# Patient Record
Sex: MALE | Race: WHITE | NOT HISPANIC OR LATINO | Employment: FULL TIME | URBAN - METROPOLITAN AREA
[De-identification: names, ages, dates, MRNs, and addresses within clinical notes are randomized per-mention and may not be internally consistent; named-entity substitution may affect disease eponyms.]

---

## 2017-02-21 PROBLEM — R21 CUTANEOUS ERUPTION: Status: ACTIVE | Noted: 2017-02-21

## 2017-02-21 PROBLEM — K21.9 ACID REFLUX: Status: ACTIVE | Noted: 2017-02-21

## 2017-02-21 PROBLEM — R42 DIZZINESS: Status: ACTIVE | Noted: 2017-02-21

## 2017-03-02 DIAGNOSIS — Z20.828 EXPOSURE TO THE FLU: Primary | ICD-10-CM

## 2017-03-02 RX ORDER — OSELTAMIVIR PHOSPHATE 75 MG/1
75 CAPSULE ORAL DAILY
Qty: 10 CAPSULE | Refills: 0 | Status: SHIPPED | OUTPATIENT
Start: 2017-03-02 | End: 2017-04-21

## 2017-04-21 ENCOUNTER — OFFICE VISIT (OUTPATIENT)
Dept: FAMILY MEDICINE CLINIC | Facility: CLINIC | Age: 32
End: 2017-04-21

## 2017-04-21 ENCOUNTER — CONVERSION ENCOUNTER (OUTPATIENT)
Dept: FAMILY MEDICINE CLINIC | Facility: CLINIC | Age: 32
End: 2017-04-21

## 2017-04-21 VITALS
DIASTOLIC BLOOD PRESSURE: 78 MMHG | RESPIRATION RATE: 18 BRPM | SYSTOLIC BLOOD PRESSURE: 92 MMHG | HEIGHT: 68 IN | HEART RATE: 51 BPM | OXYGEN SATURATION: 96 % | BODY MASS INDEX: 22.28 KG/M2 | WEIGHT: 147 LBS

## 2017-04-21 DIAGNOSIS — K21.00 REFLUX ESOPHAGITIS: ICD-10-CM

## 2017-04-21 DIAGNOSIS — Z00.00 ANNUAL PHYSICAL EXAM: Primary | ICD-10-CM

## 2017-04-21 PROCEDURE — 99385 PREV VISIT NEW AGE 18-39: CPT | Performed by: PHYSICIAN ASSISTANT

## 2017-04-21 RX ORDER — PANTOPRAZOLE SODIUM 40 MG/1
40 TABLET, DELAYED RELEASE ORAL DAILY
Qty: 30 TABLET | Refills: 3 | Status: SHIPPED | OUTPATIENT
Start: 2017-04-21 | End: 2017-04-21 | Stop reason: SDUPTHER

## 2017-04-21 RX ORDER — PANTOPRAZOLE SODIUM 40 MG/1
40 TABLET, DELAYED RELEASE ORAL DAILY
Qty: 30 TABLET | Refills: 3 | Status: SHIPPED | OUTPATIENT
Start: 2017-04-21 | End: 2017-10-04

## 2017-04-21 NOTE — PROGRESS NOTES
Subjective   Zach Camarena is a 31 y.o. male.   Chief Complaint   Patient presents with   • Annual Exam     pt here for annual exam and labs, no new concerns.         History of Present Illness   Keron is a 31-year-old male who presents to establish as new patient at today's office visit.  He was a prior patient who was last seen in 2013 in office.  I have reviewed his health history form and will to obtain his prior medical records for my review.  He has a history of reflux which she is currently taking Nexium 40 mg a day. Keron is here for annual physical examination.  Keron has been taking over-the-counter Nexium for his GERD symptoms.  States it's not helping as well anymore.  Keron has been on Prilosec in the past without relief of his symptoms.  Currently drinking 40 ounces of coffee a day and 24-32 ounces of coke a day.  Keron denied any chest pain, shortness of air, dizziness, penile discharge, dysuria, or upper respiratory symptoms.  Bowel movements have been daily without dark black tarry stools.  He has had a tetanus with pertussis in 2016.  He has no other complaints at today's office visit.  He is fasting.     The following portions of the patient's history were reviewed and updated as appropriate: allergies, current medications, past family history, past medical history, past social history and past surgical history.    Review of Systems   Constitutional: Negative.    HENT: Negative.    Eyes: Negative.    Respiratory: Negative.    Cardiovascular: Negative.    Gastrointestinal: Negative.         Reflux   Endocrine: Negative.    Genitourinary: Negative.    Musculoskeletal: Negative.    Skin: Negative.    Allergic/Immunologic: Negative.    Neurological: Negative.    Hematological: Negative.    Psychiatric/Behavioral: Negative.          Vitals:    04/21/17 0825   BP: 92/78   BP Location: Left arm   Patient Position: Sitting   Cuff Size: Adult   Pulse: 51   Resp: 18   SpO2: 96%   Weight: 147 lb (66.7 kg)  "  Height: 68\" (172.7 cm)     Body mass index is 22.35 kg/(m^2).     Allergies   Allergen Reactions   • No Known Drug Allergy      Objective   Physical Exam   Constitutional: He is oriented to person, place, and time. Vital signs are normal. He appears well-developed and well-nourished.   HENT:   Head: Normocephalic and atraumatic.   Right Ear: Hearing, tympanic membrane, external ear and ear canal normal.   Left Ear: Hearing, tympanic membrane, external ear and ear canal normal.   Nose: Nose normal. Right sinus exhibits no maxillary sinus tenderness and no frontal sinus tenderness. Left sinus exhibits no maxillary sinus tenderness and no frontal sinus tenderness.   Mouth/Throat: Uvula is midline, oropharynx is clear and moist and mucous membranes are normal.   Eyes: Conjunctivae, EOM and lids are normal. Pupils are equal, round, and reactive to light.   Neck: Trachea normal, normal range of motion and phonation normal. Neck supple. No edema present. No thyroid mass and no thyromegaly present.   Cardiovascular: Normal rate, regular rhythm, S1 normal, S2 normal, normal heart sounds and normal pulses.    Pulmonary/Chest: Effort normal and breath sounds normal.   Abdominal: Soft. Normal appearance and bowel sounds are normal. There is no hepatomegaly. There is no tenderness. No hernia. Hernia confirmed negative in the right inguinal area and confirmed negative in the left inguinal area.   Genitourinary: Testes normal and penis normal. Cremasteric reflex is present. Circumcised.   Genitourinary Comments: Exam was chaperoned by Sumi Hercules MA   Lymphadenopathy:     He has no cervical adenopathy.   Neurological: He is alert and oriented to person, place, and time. He has normal reflexes.   Reflex Scores:       Patellar reflexes are 2+ on the right side and 2+ on the left side.  Skin: Skin is warm, dry and intact.   Psychiatric: He has a normal mood and affect. His speech is normal and behavior is normal. Judgment and " thought content normal. Cognition and memory are normal.       Assessment/Plan   Zach was seen today for annual exam.    Diagnoses and all orders for this visit:    Annual physical exam  -     CBC & Differential  -     Comprehensive Metabolic Panel  -     Lipid Panel With LDL / HDL Ratio  -     Thyroid Panel With TSH    Reflux esophagitis  -     Discontinue: pantoprazole (PROTONIX) 40 MG EC tablet; Take 1 tablet by mouth Daily.  -     pantoprazole (PROTONIX) 40 MG EC tablet; Take 1 tablet by mouth Daily.    1.  New annual Physical examination: I have reviewed Keron's history form and will try to obtain his medical records for my review.  He is fasting at today's office visit will have a CBC, CMP, lipid profile and a thyroid profile with TSH.  He was encouraged to increase his physical activity and to eat healthy.  Keron will be notified of test results when completed.  2.  New GERD: Keron has been taking Nexium for this past several years.  He has had a EGD greater than 5 years ago that showed esophageal reflux.  Currently  Is not helping with his GERD symptoms.  Span on Prilosec in the past.  I have sent to pharmacy a prescription for Protonix to see if this helps improve his symptoms.  I've asked him to stop the Nexium at this time.  I've encouraged Keron to decrease his caffeine intake as well.  He will update status in the next few weeks.      Dragon transcription disclaimer    Much of this encounter note is an electronic transcription/translation of spoken language to printed text.  The electronic translation of spoken language may permit erroneous, or at times, nonsensical words or phrases to be inadvertently transcribed.  Although I have reviewed the note for such errors, some may still exist.    Tashia Liu PA-C  Family Practice

## 2017-04-22 LAB
ALBUMIN SERPL-MCNC: 4.9 G/DL (ref 3.5–5.2)
ALBUMIN/GLOB SERPL: 2.1 G/DL
ALP SERPL-CCNC: 75 U/L (ref 40–129)
ALT SERPL-CCNC: 11 U/L (ref 5–41)
AST SERPL-CCNC: 18 U/L (ref 5–40)
BASOPHILS # BLD AUTO: 0.06 10*3/MM3 (ref 0–0.2)
BASOPHILS NFR BLD AUTO: 1.1 % (ref 0–2)
BILIRUB SERPL-MCNC: 0.4 MG/DL (ref 0.2–1.2)
BUN SERPL-MCNC: 15 MG/DL (ref 6–20)
BUN/CREAT SERPL: 12.3 (ref 7–25)
CALCIUM SERPL-MCNC: 10.1 MG/DL (ref 8.6–10.5)
CHLORIDE SERPL-SCNC: 100 MMOL/L (ref 98–107)
CHOLEST SERPL-MCNC: 228 MG/DL (ref 0–200)
CO2 SERPL-SCNC: 27.5 MMOL/L (ref 22–29)
CREAT SERPL-MCNC: 1.22 MG/DL (ref 0.76–1.27)
EOSINOPHIL # BLD AUTO: 0.27 10*3/MM3 (ref 0.1–0.3)
EOSINOPHIL NFR BLD AUTO: 5 % (ref 0–4)
ERYTHROCYTE [DISTWIDTH] IN BLOOD BY AUTOMATED COUNT: 12.6 % (ref 11.5–14.5)
FT4I SERPL CALC-MCNC: 1.9 (ref 1.2–4.9)
GLOBULIN SER CALC-MCNC: 2.3 GM/DL
GLUCOSE SERPL-MCNC: 84 MG/DL (ref 65–99)
HCT VFR BLD AUTO: 45.9 % (ref 42–52)
HDLC SERPL-MCNC: 57 MG/DL (ref 40–60)
HGB BLD-MCNC: 15.4 G/DL (ref 14–18)
IMM GRANULOCYTES # BLD: 0.03 10*3/MM3 (ref 0–0.03)
IMM GRANULOCYTES NFR BLD: 0.6 % (ref 0–0.5)
LDLC SERPL CALC-MCNC: 143 MG/DL (ref 0–100)
LDLC/HDLC SERPL: 2.52 {RATIO}
LYMPHOCYTES # BLD AUTO: 2.39 10*3/MM3 (ref 0.6–4.8)
LYMPHOCYTES NFR BLD AUTO: 44.5 % (ref 20–45)
MCH RBC QN AUTO: 28.7 PG (ref 27–31)
MCHC RBC AUTO-ENTMCNC: 33.6 G/DL (ref 31–37)
MCV RBC AUTO: 85.6 FL (ref 80–94)
MONOCYTES # BLD AUTO: 0.54 10*3/MM3 (ref 0–1)
MONOCYTES NFR BLD AUTO: 10.1 % (ref 3–8)
NEUTROPHILS # BLD AUTO: 2.08 10*3/MM3 (ref 1.5–8.3)
NEUTROPHILS NFR BLD AUTO: 38.7 % (ref 45–70)
NRBC BLD AUTO-RTO: 0 /100 WBC (ref 0–0)
PLATELET # BLD AUTO: 229 10*3/MM3 (ref 140–500)
POTASSIUM SERPL-SCNC: 4.9 MMOL/L (ref 3.5–5.2)
PROT SERPL-MCNC: 7.2 G/DL (ref 6–8.5)
RBC # BLD AUTO: 5.36 10*6/MM3 (ref 4.7–6.1)
SODIUM SERPL-SCNC: 143 MMOL/L (ref 136–145)
T3RU NFR SERPL: 30 % (ref 24–39)
T4 SERPL-MCNC: 6.2 UG/DL (ref 4.5–12)
TRIGL SERPL-MCNC: 138 MG/DL (ref 0–150)
TSH SERPL DL<=0.005 MIU/L-ACNC: 3.29 UIU/ML (ref 0.45–4.5)
VLDLC SERPL CALC-MCNC: 27.6 MG/DL (ref 8–32)
WBC # BLD AUTO: 5.37 10*3/MM3 (ref 4.8–10.8)

## 2017-04-24 ENCOUNTER — TELEPHONE (OUTPATIENT)
Dept: FAMILY MEDICINE CLINIC | Facility: CLINIC | Age: 32
End: 2017-04-24

## 2017-04-24 NOTE — TELEPHONE ENCOUNTER
----- Message from Tashia Liu PA-C sent at 4/24/2017  7:04 AM EDT -----  1.  Notify patient that fasting blood sugar was normal at 4.  2.  Cholesterol was 228, triglycerides 138, HDL 57 and LDL was 143.  Cholesterol readings were slightly elevated.  Please decrease fatty food intake and increase physical activity.  Plan to repeat lipid profile in one year.  3.  Thyroid testing was normal.

## 2017-08-25 ENCOUNTER — OFFICE VISIT (OUTPATIENT)
Dept: FAMILY MEDICINE CLINIC | Facility: CLINIC | Age: 32
End: 2017-08-25

## 2017-08-25 VITALS
DIASTOLIC BLOOD PRESSURE: 68 MMHG | SYSTOLIC BLOOD PRESSURE: 100 MMHG | OXYGEN SATURATION: 97 % | HEIGHT: 68 IN | RESPIRATION RATE: 20 BRPM | BODY MASS INDEX: 22.13 KG/M2 | HEART RATE: 58 BPM | TEMPERATURE: 98.4 F | WEIGHT: 146 LBS

## 2017-08-25 DIAGNOSIS — J02.9 SORE THROAT: Primary | ICD-10-CM

## 2017-08-25 DIAGNOSIS — J02.9 PHARYNGITIS, UNSPECIFIED ETIOLOGY: ICD-10-CM

## 2017-08-25 LAB
EXPIRATION DATE: NORMAL
INTERNAL CONTROL: NORMAL
Lab: NORMAL
S PYO AG THROAT QL: NEGATIVE

## 2017-08-25 PROCEDURE — 87880 STREP A ASSAY W/OPTIC: CPT | Performed by: NURSE PRACTITIONER

## 2017-08-25 PROCEDURE — 99213 OFFICE O/P EST LOW 20 MIN: CPT | Performed by: NURSE PRACTITIONER

## 2017-08-25 RX ORDER — AMOXICILLIN 500 MG/1
500 CAPSULE ORAL 3 TIMES DAILY
Qty: 21 CAPSULE | Refills: 0 | Status: SHIPPED | OUTPATIENT
Start: 2017-08-25 | End: 2017-09-01

## 2017-08-25 NOTE — PROGRESS NOTES
Subjective   Zach Camarena is a 31 y.o. male.   Chief Complaint   Patient presents with   • Sore Throat     x 3 days,trouble sleeping     Vitals:    08/25/17 1421   BP: 100/68   Pulse: 58   Resp: 20   Temp: 98.4 °F (36.9 °C)   SpO2: 97%       Allergies   Allergen Reactions   • No Known Drug Allergy        HPI Comments: Very painful at night.     Sore Throat    This is a new problem. The current episode started in the past 7 days. The problem has been gradually worsening. Neither side of throat is experiencing more pain than the other. The maximum temperature recorded prior to his arrival was 100.4 - 100.9 F. The fever has been present for less than 1 day. The pain is moderate. Associated symptoms include coughing (non-productive), a hoarse voice and a plugged ear sensation. Pertinent negatives include no congestion, headaches, shortness of breath or trouble swallowing. He has had no exposure to strep. He has tried nothing for the symptoms.        The following portions of the patient's history were reviewed and updated as appropriate: allergies, current medications, past family history, past medical history, past social history, past surgical history and problem list.    Review of Systems   Constitutional: Positive for chills and fever.   HENT: Positive for hoarse voice, postnasal drip, sore throat and voice change. Negative for congestion and trouble swallowing.    Respiratory: Positive for cough (non-productive). Negative for shortness of breath.    Musculoskeletal: Negative.    Skin: Negative.    Neurological: Negative for headaches.   All other systems reviewed and are negative.      Objective   Physical Exam   Constitutional: He is oriented to person, place, and time. He appears well-developed and well-nourished.   HENT:   Mouth/Throat: Oropharynx is clear and moist and mucous membranes are normal. No posterior oropharyngeal erythema or tonsillar abscesses.   PND drainage.    Cardiovascular: Normal rate.     Pulmonary/Chest: Effort normal.   Neurological: He is alert and oriented to person, place, and time.   Skin: Skin is warm and dry.   Psychiatric: He has a normal mood and affect. His behavior is normal. Judgment and thought content normal.   Nursing note and vitals reviewed.      Assessment/Plan   Problems Addressed this Visit     None      Visit Diagnoses     Sore throat    -  Primary    Relevant Orders    POCT rapid strep A (Completed)    Pharyngitis, unspecified etiology        Relevant Medications    amoxicillin (AMOXIL) 500 MG capsule        Tylenol and Aleve as needed for fever and pain.   May try anti-histamine for mucous reduction.   If no improvement by Monday, may fill abx.     Results for orders placed or performed in visit on 08/25/17   POCT rapid strep A   Result Value Ref Range    Rapid Strep A Screen Negative Negative, VALID, INVALID, Not Performed    Internal Control Passed Passed    Lot Number [id number]     Expiration Date 2018-8-31

## 2017-10-04 ENCOUNTER — OFFICE VISIT (OUTPATIENT)
Dept: FAMILY MEDICINE CLINIC | Facility: CLINIC | Age: 32
End: 2017-10-04

## 2017-10-04 ENCOUNTER — HOSPITAL ENCOUNTER (OUTPATIENT)
Dept: GENERAL RADIOLOGY | Facility: HOSPITAL | Age: 32
Discharge: HOME | End: 2017-10-04
Admitting: PHYSICIAN ASSISTANT

## 2017-10-04 VITALS
HEIGHT: 68 IN | DIASTOLIC BLOOD PRESSURE: 70 MMHG | RESPIRATION RATE: 16 BRPM | SYSTOLIC BLOOD PRESSURE: 100 MMHG | BODY MASS INDEX: 21.98 KG/M2 | WEIGHT: 145 LBS | HEART RATE: 58 BPM | OXYGEN SATURATION: 99 % | TEMPERATURE: 98.4 F

## 2017-10-04 DIAGNOSIS — Z23 FLU VACCINE NEED: ICD-10-CM

## 2017-10-04 DIAGNOSIS — M25.511 CHRONIC RIGHT SHOULDER PAIN: ICD-10-CM

## 2017-10-04 DIAGNOSIS — G89.29 CHRONIC RIGHT SHOULDER PAIN: ICD-10-CM

## 2017-10-04 DIAGNOSIS — M25.511 CHRONIC RIGHT SHOULDER PAIN: Primary | ICD-10-CM

## 2017-10-04 DIAGNOSIS — G89.29 CHRONIC RIGHT SHOULDER PAIN: Primary | ICD-10-CM

## 2017-10-04 DIAGNOSIS — K21.00 GASTROESOPHAGEAL REFLUX DISEASE WITH ESOPHAGITIS: ICD-10-CM

## 2017-10-04 PROCEDURE — 90686 IIV4 VACC NO PRSV 0.5 ML IM: CPT | Performed by: PHYSICIAN ASSISTANT

## 2017-10-04 PROCEDURE — 73030 X-RAY EXAM OF SHOULDER: CPT

## 2017-10-04 PROCEDURE — 90471 IMMUNIZATION ADMIN: CPT | Performed by: PHYSICIAN ASSISTANT

## 2017-10-04 PROCEDURE — 99214 OFFICE O/P EST MOD 30 MIN: CPT | Performed by: PHYSICIAN ASSISTANT

## 2017-10-04 RX ORDER — RANITIDINE 150 MG/1
150 CAPSULE ORAL 2 TIMES DAILY
Qty: 60 CAPSULE | Refills: 11 | Status: SHIPPED | OUTPATIENT
Start: 2017-10-04 | End: 2018-01-31 | Stop reason: ALTCHOICE

## 2017-10-04 RX ORDER — ESOMEPRAZOLE MAGNESIUM 40 MG/1
40 CAPSULE, DELAYED RELEASE ORAL
COMMUNITY
End: 2017-10-04

## 2017-10-04 NOTE — PROGRESS NOTES
"Subjective   Zach Camarena is a 31 y.o. male.   Chief Complaint   Patient presents with   • Pain     right shoulder       History of Present Illness     Keron is a 31 year old male who presents with right shoulder pain for the past right shoulder pain for the past several months.  Describes the pain has a dull ache that waxes and wanes.   He has notice decreased range of motion.  Keron has pain when throwing the ball or lifting weights.He has problems sleeping on his right side.  Denied any injury/trauma.   Keron has not used any OTC medications or sport creams. He still GERD symptoms.  Keron does smokeless tobacco.  She has taken OTC Protonix and Nexium.    He had an EGD [id number].  Caffeine intake energy drink and 48 oz of coffee when he is working as a .  Appetite and sleep has been normal.        The following portions of the patient's history were reviewed and updated as appropriate: allergies, current medications, past family history, past medical history, past social history and past surgical history.    Review of Systems   Constitutional: Negative.    HENT: Negative.    Eyes: Negative.    Respiratory: Negative.  Negative for cough, chest tightness, shortness of breath and wheezing.    Cardiovascular: Negative.  Negative for chest pain, palpitations and leg swelling.   Gastrointestinal: Negative.    Endocrine: Negative.    Genitourinary: Negative.    Musculoskeletal: Positive for arthralgias.   Skin: Negative.    Allergic/Immunologic: Negative.    Neurological: Negative.    Hematological: Negative.    Psychiatric/Behavioral: Negative.    All other systems reviewed and are negative.    Vitals:    10/04/17 1425   BP: 100/70   BP Location: Left arm   Patient Position: Sitting   Cuff Size: Adult   Pulse: 58   Resp: 16   Temp: 98.4 °F (36.9 °C)   TempSrc: Oral   SpO2: 99%   Weight: 145 lb (65.8 kg)   Height: 68\" (172.7 cm)       Wt Readings from Last 3 Encounters:   10/04/17 145 lb (65.8 kg)   08/25/17 " 146 lb (66.2 kg)   04/21/17 147 lb (66.7 kg)       BP Readings from Last 3 Encounters:   10/04/17 100/70   08/25/17 100/68   04/21/17 92/78     Body mass index is 22.05 kg/(m^2).  Allergies   Allergen Reactions   • No Known Drug Allergy        Objective   Physical Exam   Constitutional: He is oriented to person, place, and time. Vital signs are normal. He appears well-developed.   Neck: Trachea normal and phonation normal. Neck supple. No edema present.   Cardiovascular: Normal rate, regular rhythm, S1 normal, S2 normal, normal heart sounds and normal pulses.    Pulmonary/Chest: Effort normal and breath sounds normal.   Abdominal: Soft. Normal appearance and bowel sounds are normal. There is no hepatomegaly. There is no tenderness.   Musculoskeletal:        Right shoulder: He exhibits tenderness and bony tenderness. He exhibits normal range of motion, no swelling, no pain, no spasm, normal pulse and normal strength.   Neurological: He is alert and oriented to person, place, and time.   Skin: Skin is warm, dry and intact.   Psychiatric: He has a normal mood and affect. His speech is normal and behavior is normal. Judgment and thought content normal. Cognition and memory are normal.       Assessment/Plan   Zach was seen today for pain.    Diagnoses and all orders for this visit:    Chronic right shoulder pain  -     XR Shoulder 2+ View Right; Future    Flu vaccine need  -     Flu Vaccine Quad PF 3YR+ (FLUARIX/FLUZONE [id number])    Gastroesophageal reflux disease with esophagitis  -     ranitidine (ZANTAC) 150 MG capsule; Take 1 capsule by mouth 2 (Two) Times a Day.  -     Ambulatory Referral to Gastroenterology    1. New shoulder pain: Zach has been having right shoulder pain for several months.  He will have a right shoulder x-ray at the [facility] today to further evaluate his symptoms.  We will consider possible physical therapy or orthopedic referral in future if warranted.  He'll be notified of test  results when completed.  2.  Need for influenza immunization: Keron has given written consent and will receive the flu shot at today's office visit.  3.  Chronic GERD: He is taking his Nexium or Protonix medication daily.  I will schedule a referral to GI for possible EGD.  I will change his patient to Zantac 150 mg to take twice daily.  I've encouraged him to quit his smokeless tobacco products as well.  He was encouraged to decrease his caffeine intake.          Dragon transcription disclaimer    Much of this encounter note is an electronic transcription/translation of spoken language to printed text.  The electronic translation of spoken language may permit erroneous, or at times, nonsensical words or phrases to be inadvertently transcribed.  Although I have reviewed the note for such errors, some may still exist.    Tashia Liu PA-C  Family Practice

## 2017-10-05 ENCOUNTER — TELEPHONE (OUTPATIENT)
Dept: FAMILY MEDICINE CLINIC | Facility: CLINIC | Age: 32
End: 2017-10-05

## 2017-10-05 DIAGNOSIS — G89.29 CHRONIC RIGHT SHOULDER PAIN: Primary | ICD-10-CM

## 2017-10-05 DIAGNOSIS — M25.511 CHRONIC RIGHT SHOULDER PAIN: Primary | ICD-10-CM

## 2017-10-05 NOTE — TELEPHONE ENCOUNTER
----- Message from Tashia Liu PA-C sent at 10/5/2017  7:39 AM EDT -----  notify patient that his shoulder x-ray showed chronic deformity of his mid right clavicle.  This is from his old clavicle fracture.  No acute process noted.  I will schedule an appointment with Dr. Adams at sports medicine at [place] for possible injection into shoulder for bursitis.

## 2017-10-10 ENCOUNTER — OFFICE VISIT (OUTPATIENT)
Dept: SPORTS MEDICINE | Facility: CLINIC | Age: 32
End: 2017-10-10

## 2017-10-10 VITALS
SYSTOLIC BLOOD PRESSURE: 116 MMHG | HEIGHT: 68 IN | WEIGHT: 144 LBS | DIASTOLIC BLOOD PRESSURE: 70 MMHG | BODY MASS INDEX: 21.82 KG/M2

## 2017-10-10 DIAGNOSIS — M75.41 IMPINGEMENT SYNDROME OF RIGHT SHOULDER: Primary | ICD-10-CM

## 2017-10-10 PROCEDURE — 99244 OFF/OP CNSLTJ NEW/EST MOD 40: CPT | Performed by: FAMILY MEDICINE

## 2017-10-10 PROCEDURE — 20610 DRAIN/INJ JOINT/BURSA W/O US: CPT | Performed by: FAMILY MEDICINE

## 2017-10-10 RX ORDER — TRIAMCINOLONE ACETONIDE 40 MG/ML
80 INJECTION, SUSPENSION INTRA-ARTICULAR; INTRAMUSCULAR ONCE
Status: COMPLETED | OUTPATIENT
Start: 2017-10-10 | End: 2017-10-10

## 2017-10-10 RX ADMIN — TRIAMCINOLONE ACETONIDE 80 MG: 40 INJECTION, SUSPENSION INTRA-ARTICULAR; INTRAMUSCULAR at 15:19

## 2017-10-10 NOTE — PROGRESS NOTES
"Zach is a 31 y.o. year old male    Chief Complaint   Patient presents with   • Arm Pain     Right       History of Present Illness  HPI   Here today for R shoulder/upper arm pain for about 6 months. Mild, nagging burning pain. Worse with throwing motion or laying on that side. PMH clavicle fracture around 2001, treated surgically with bony debridement.  Also history last year of left clavicle and scapular fracture.  Recent xrays in [place], referred by Tashia Liu for further evaluation and management.     I have reviewed the patient's medical, family, and social history in detail and updated the computerized patient record.    Review of Systems   Constitutional: Negative for fever.   Skin: Negative for wound.   Neurological: Negative for numbness.   All other systems reviewed and are negative.      /70  Ht 68\" (172.7 cm)  Wt 144 lb (65.3 kg)  BMI 21.9 kg/m2     Physical Exam    Vital signs reviewed.   General: No acute distress.  Eyes: conjunctiva clear; pupils equally round and reactive  ENT: external ears and nose atraumatic; oropharynx clear  CV: no peripheral edema, 2+ distal pulses  Resp: normal respiratory effort, no use of accessory muscles  Skin: no rashes or wounds; normal turgor  Psych: mood and affect appropriate; recent and remote memory intact  Neuro: sensation to light touch intact    MSK Exam:  Right Shoulder Exam     Tenderness   Right shoulder tenderness location: subacromial space.    Range of Motion   The patient has normal right shoulder ROM.  Right shoulder active abduction: painful.   Right shoulder internal rotation 0 degrees: painful.     Muscle Strength   The patient has normal right shoulder strength.    Tests   Drop Arm: negative  Hawkin's test: positive  Impingement: positive    Other   Erythema: absent  Sensation: normal    Comments:  There is a prominent middle third of the clavicle          Review images and report of recent shoulder x-rays performed at an outside " "facility.  There are are chronic changes related to previous fracture and surgical debridement of the middle third of the clavicle.  Otherwise normal.    Shoulder Injection Procedure Note    Right shoulder subacromial bursa injection was discussed with the patient in detail, including indication, risks, benefits, and alternatives. Verbal consent was given for the procedure. Injection was performed by MD.  Injection site was identified by physical examination and cleaned with Betadine and alcohol swabs. Prior to needle insertion, ethyl chloride spray was used for surface anesthesia. Sterile technique was used.  A 25-gauge, 1.5\" needle was directed to the joint from a posterior approach. Injectate was passed into the subacromial bursa without difficulty. The needle was removed and a simple bandage was applied. The procedure was tolerated well without difficulty.    Injection mixture:  1% lidocaine without epinephrine: 2 mL  0.5% bupivacaine without epinephrine: 2 mL  40 mg/mL triamcinolone acetonide: 2 mL       Diagnoses and all orders for this visit:    Impingement syndrome of right shoulder  -     Ambulatory Referral to Physical Therapy Evaluate and treat  -     triamcinolone acetonide (KENALOG-40) injection 80 mg; Inject 2 mL into the joint 1 (One) Time.    Discussed diagnostic and treatment considerations in detail.  I think this is most likely secondary impingement in the shoulder and not a sign of any underlying structural injury to the shoulder.  Agreed upon injection today.  Expect physical therapy to be a brief course for home stability strengthening and optimizing his rotator cuff function.  Could also have a component of scapular dyskinesis as well.    EMR Dragon/Transcription disclaimer:    Much of this encounter note is an electronic transcription/translation of spoken language to printed text.  The electronic translation of spoken language may permit erroneous, or at times, nonsensical words or phrases " to be inadvertently transcribed.  Although I have reviewed the note for such errors some may still exist.

## 2017-10-17 ENCOUNTER — TREATMENT (OUTPATIENT)
Dept: PHYSICAL THERAPY | Facility: CLINIC | Age: 32
End: 2017-10-17

## 2017-10-17 DIAGNOSIS — M75.41 IMPINGEMENT SYNDROME OF RIGHT SHOULDER: Primary | ICD-10-CM

## 2017-10-17 PROCEDURE — 97110 THERAPEUTIC EXERCISES: CPT | Performed by: PHYSICAL THERAPIST

## 2017-10-17 PROCEDURE — 97161 PT EVAL LOW COMPLEX 20 MIN: CPT | Performed by: PHYSICAL THERAPIST

## 2017-10-17 PROCEDURE — 97140 MANUAL THERAPY 1/> REGIONS: CPT | Performed by: PHYSICAL THERAPIST

## 2017-10-17 NOTE — PROGRESS NOTES
Physical Therapy Initial Evaluation and Plan of Care        Patient: Zach Camarena   : 1985  Diagnosis/ICD-10 Code:  Impingement syndrome of right shoulder [M75.41]  Referring practitioner: Francisco Coffey MD  Date of Initial Visit: 10/17/2017  Today's Date: 10/17/2017  Patient seen for 1 sessions               Subjective Evaluation    History of Present Illness  Date of onset: 2017  Mechanism of injury: Progressive right shoulder pain- Right clavicle fx  - Left clavicle and scapula fx - 2016      Patient Occupation:    Precautions and Work Restrictions: noneQuality of life: excellent    Pain  Current pain ratin  At best pain ratin  At worst pain ratin  Location: bicep muscle belly   Quality: dull ache (nagging)  Relieving factors: rest  Exacerbated by: laying on right side, throwing baseball.  Progression: no change    Hand dominance: right    Diagnostic Tests  X-ray: normal (healed clavicle fx)    Treatments  Current treatment: injection treatment  Current treatment comments: cortisone injection .     Patient Goals  Patient goals for therapy: decreased pain, increased motion, increased strength, independence with ADLs/IADLs and return to sport/leisure activities             Objective       Palpation     Right Tenderness of the pectoralis major and pectoralis minor.     Tenderness     Right Shoulder  Tenderness in the biceps tendon (proximal) and bicipital groove.     Active Range of Motion     Right Shoulder   Flexion: 180 degrees   Abduction: 180 degrees     Strength/Myotome Testing     Right Shoulder     Planes of Motion   Right shoulder forward flexion strength: 5-/5.   Abduction: 5   External rotation at 45°: 5   Internal rotation at 45°: 5     Isolated Muscles   Right biceps strength: 5-/5.     Tests     Right Shoulder   Positive AC shear, Hawkin's and Speed's.   Negative active compression (Ross), clunk, drop arm, empty can, Neer's, Jc's sign, ULTT1,  SLADE2 and Ars.          Assessment & Plan     Assessment  Impairments: abnormal muscle firing, abnormal or restricted ROM, activity intolerance, impaired physical strength and pain with function  Assessment details: 31 y.o male presents with 1) tender right LHB tendon 2) decreased right shoulder strength 3) positive Speeds and Mistry-Patrick 4) decreased tolerance to throwing baseball and repetitive right UE activity   Prognosis: fair  Functional Limitations: carrying objects, lifting, pulling, pushing, uncomfortable because of pain, moving in bed, reaching behind back, reaching overhead and unable to perform repetitive tasks  Goals  Plan Goals: SHORT TERM GOALS:     6 weeks  1. Pt I w/ HEP  2. Pt to demonstrate ability to perform 30 minutes continuous activity in the clinic without increase in pain     LONG TERM GOALS:   12 weeks  1. Pt to demonstrate AROM of the right shoulder to WNL to allow ability to perform all necessary functional activities  2. Pt to demonstrate ability to lift 15# OH with the right arm without increase in pain  3. Pt to report being able to work full duty without increase in pain in the shoulder  4. Pt to tolerate 60 minutes continuous activity in the clinic without increase in pain     Plan  Therapy options: will be seen for skilled physical therapy services  Planned modality interventions: cryotherapy, iontophoresis, thermotherapy (hydrocollator packs) and ultrasound  Other planned modality interventions: dry needling  Planned therapy interventions: ADL retraining, fine motor coordination training, flexibility, functional ROM exercises, home exercise program, joint mobilization, manual therapy, neuromuscular re-education, postural training, soft tissue mobilization, strengthening, stretching and therapeutic activities  Frequency: 2x week  Duration in weeks: 8  Treatment plan discussed with: patient        Manual Therapy:    8     mins  [id number];  Therapeutic Exercise:    15    mins   [id number];     Neuromuscular Ge:        mins  11873;    Therapeutic Activity:          mins  73798;     Gait Training:           mins  60070;     Ultrasound:    8     mins  26481;    Electrical Stimulation:         mins  32839 ( );  Dry Needling          mins self-pay    Timed Treatment:   31   mins   Total Treatment:      55  mins    PT SIGNATURE: Claudia Garcia, PT   DATE TREATMENT INITIATED: 10/17/2017    Initial Certification  Certification Period: 1/15/2018  I certify that the therapy services are furnished while this patient is under my care.  The services outlined above are required by this patient, and will be reviewed every 90 days.     PHYSICIAN: Francisco Coffey MD      DATE:     Please sign and return via fax to [phone].. Thank you, [facility].

## 2017-11-06 ENCOUNTER — TREATMENT (OUTPATIENT)
Dept: PHYSICAL THERAPY | Facility: CLINIC | Age: 32
End: 2017-11-06

## 2017-11-06 DIAGNOSIS — M75.41 IMPINGEMENT SYNDROME OF RIGHT SHOULDER: Primary | ICD-10-CM

## 2017-11-06 PROCEDURE — 97110 THERAPEUTIC EXERCISES: CPT | Performed by: PHYSICAL THERAPIST

## 2017-11-06 PROCEDURE — 97530 THERAPEUTIC ACTIVITIES: CPT | Performed by: PHYSICAL THERAPIST

## 2017-11-06 PROCEDURE — 97035 APP MDLTY 1+ULTRASOUND EA 15: CPT | Performed by: PHYSICAL THERAPIST

## 2017-11-06 PROCEDURE — 97112 NEUROMUSCULAR REEDUCATION: CPT | Performed by: PHYSICAL THERAPIST

## 2017-11-06 NOTE — PROGRESS NOTES
Physical Therapy Daily Progress Note    Time In 12:55  Time Out 2:00  Visit # : 2  Zach Camarena reports: pain began 5-6 months ago. No trauma but did training for KSP with a lot of repetitive use of the shoulder ie pushups reaching over in the shoulder.  No rehab after left scapular fx, just worked out on my own.  Bench press, curls, squats, hammer curls, bent over rows,  flys without pain. No longer doing overhead activities at the gym.     Subjective     Objective   See Exercise, Manual, and Modality Logs for complete treatment.   Infraspinatus tender, winging scapula with abduction, flexion.  (-) impingement (HK, empty can, Neer's)  Lats 4/5, mid trap 4/5 bilaterally, LT 4/5, rhomboids 5/5  IR/ER at 90 degrees 4/5, pain with IR MMT and placement into ER fully.  IR at 90 degrees 70.  Joint mobility right GH joint WNL except posterior with full IR.    Assessment/Plan  Impingement tests negative today.  Only recreated patient's pain with arm under pillow and leaning on the bicep.  Today, pain is in scapula which is a new area. Some weakness in the scapular muscles but no significant pain with testing.  Definite imbalance from anterior to posterior.   There is noted to be dyskinesia which could be from the prior fractures but most likely worse from the weakness posteriorly.    Progress strengthening /stabilization /functional activity to normalize kinematics at the shoulder and scapula.  Assess new strength exercises.         Manual Therapy:          mins  64381;  Therapeutic Exercise:   10     mins  31854;     Neuromuscular Ge:    10    mins  99906;    Therapeutic Activity:     15     mins  24348;     Gait Training:            mins  47171;     Ultrasound:     6/8     mins  35642;    Electrical Stimulation:          mins  08588 ( );  Dry Needling           mins self-pay    Timed Treatment:   53   mins   Total Treatment:     65   mins    Catalina Cochran, PT  Physical Therapist

## 2017-11-08 ENCOUNTER — TREATMENT (OUTPATIENT)
Dept: PHYSICAL THERAPY | Facility: CLINIC | Age: 32
End: 2017-11-08

## 2017-11-08 DIAGNOSIS — M75.41 IMPINGEMENT SYNDROME OF RIGHT SHOULDER: Primary | ICD-10-CM

## 2017-11-08 PROCEDURE — 97140 MANUAL THERAPY 1/> REGIONS: CPT | Performed by: PHYSICAL THERAPIST

## 2017-11-08 PROCEDURE — 97110 THERAPEUTIC EXERCISES: CPT | Performed by: PHYSICAL THERAPIST

## 2017-11-08 NOTE — PROGRESS NOTES
Physical Therapy Daily Progress Note    Time In 3:00  Time Out 3:45    Visit # : 3  Zach Camarena reports: it's not really bothering me today.  Went to the gym last night. No pain at night because I am not sleeping on that side anymore.    Subjective     Objective   See Exercise, Manual, and Modality Logs for complete treatment.   Mild infraspinatus tenderness, none at biceps    Assessment/Plan  Improved pain.  Able to tolerate all strengthening without increased levels of pain. No modalities needed today as patient is not having any pain or tenderness.  Progress strengthening /stabilization /functional activity           Manual Therapy:    8     mins  00097;  Therapeutic Exercise:    30     mins  76876;     Neuromuscular Ge:         mins  52184;    Therapeutic Activity:           mins  91873;     Gait Training:            mins  81209;     Ultrasound:           mins  06523;    Electrical Stimulation:          mins  11143 ( );  Dry Needling           mins self-pay    Timed Treatment:   38   mins   Total Treatment:     45   mins    Catalina Cochran, PT  Physical Therapist

## 2017-11-29 ENCOUNTER — OFFICE VISIT (OUTPATIENT)
Dept: GASTROENTEROLOGY | Facility: CLINIC | Age: 32
End: 2017-11-29

## 2017-11-29 ENCOUNTER — TELEPHONE (OUTPATIENT)
Dept: FAMILY MEDICINE CLINIC | Facility: CLINIC | Age: 32
End: 2017-11-29

## 2017-11-29 VITALS
WEIGHT: 149 LBS | DIASTOLIC BLOOD PRESSURE: 68 MMHG | HEIGHT: 68 IN | BODY MASS INDEX: 22.58 KG/M2 | SYSTOLIC BLOOD PRESSURE: 118 MMHG

## 2017-11-29 DIAGNOSIS — K21.9 GASTROESOPHAGEAL REFLUX DISEASE, ESOPHAGITIS PRESENCE NOT SPECIFIED: Primary | ICD-10-CM

## 2017-11-29 PROCEDURE — 99203 OFFICE O/P NEW LOW 30 MIN: CPT | Performed by: INTERNAL MEDICINE

## 2017-11-29 NOTE — PROGRESS NOTES
PATIENT INFORMATION  Zach Camarena       - 1985    CHIEF COMPLAINT  Chief Complaint   Patient presents with   • Heartburn       HISTORY OF PRESENT ILLNESS  Heartburn     31 yo with GERD for at least 10 years currently maintained on Nexium otc 2 pills daily. Symptoms controlled on this. He does wake up in the morning with chest pain, once monthly. He recalls having an EGD done in  in [place] and some abnormalities were noted, no sure of results.  He does chew tobacco. No etoh.  He does note some issues with swallowing such that he has to eat slower. No melena or hematochezia. Weight has been slowly increasing.       REVIEW OF SYSTEMS  Review of Systems   Gastrointestinal:        REFLUX   All other systems reviewed and are negative.        ACTIVE PROBLEMS  Patient Active Problem List    Diagnosis   • Flu vaccine need [Z23]   • Chronic right shoulder pain [M25.511, G89.29]   • Annual physical exam [Z00.00]   • Reflux esophagitis [K21.0]   • Dizziness [R42]   • Acid reflux [K21.9]   • Cutaneous eruption [R21]         PAST MEDICAL HISTORY  Past Medical History:   Diagnosis Date   • GERD (gastroesophageal reflux disease)          SURGICAL HISTORY  Past Surgical History:   Procedure Laterality Date   • CLAVICLE SURGERY     • NEPHRECTOMY      as infant         FAMILY HISTORY  Family History   Problem Relation Age of Onset   • No Known Problems Mother    • Irregular heart beat Father    • No Known Problems Sister    • No Known Problems Brother    • No Known Problems Daughter    • No Known Problems Son    • Cancer Maternal Uncle    • No Known Problems Brother          SOCIAL HISTORY  Social History     Occupational History   • Not on file.     Social History Main Topics   • Smoking status: Former Smoker     Quit date:    • Smokeless tobacco: Not on file   • Alcohol use No   • Drug use: No   • Sexual activity: Yes         CURRENT MEDICATIONS    Current Outpatient Prescriptions:   •  ranitidine  "(ZANTAC) 150 MG capsule, Take 1 capsule by mouth 2 (Two) Times a Day., Disp: 60 capsule, Rfl: 11    ALLERGIES  No known drug allergy    VITALS  Vitals:    11/29/17 1337   BP: 118/68   Weight: 149 lb (67.6 kg)   Height: 68\" (172.7 cm)       LAST RESULTS   Office Visit on 08/25/2017   Component Date Value Ref Range Status   • Rapid Strep A Screen 08/25/2017 Negative  Negative, VALID, INVALID, Not Performed Final   • Internal Control 08/25/2017 Passed  Passed Final   • Lot Number 08/25/2017 [id number]   Final   • Expiration Date 08/25/2017 2018-8-31   Final     No results found.    PHYSICAL EXAM  Physical Exam   Constitutional: He is oriented to person, place, and time. He appears well-developed and well-nourished. No distress.   HENT:   Head: Normocephalic and atraumatic.   Eyes: EOM are normal. Pupils are equal, round, and reactive to light.   Neck: Neck supple. No tracheal deviation present.   Cardiovascular: Normal rate, regular rhythm, normal heart sounds and intact distal pulses.  Exam reveals no gallop and no friction rub.    No murmur heard.  Pulmonary/Chest: Effort normal and breath sounds normal. No respiratory distress. He has no wheezes. He has no rales. He exhibits no tenderness.   Abdominal: Soft. Bowel sounds are normal. He exhibits no distension. There is no tenderness. There is no rebound and no guarding.   Musculoskeletal: He exhibits no edema.   Lymphadenopathy:     He has no cervical adenopathy.   Neurological: He is alert and oriented to person, place, and time.   Skin: Skin is warm. He is not diaphoretic. No erythema.   Psychiatric: He has a normal mood and affect. His behavior is normal. Judgment and thought content normal.   Nursing note and vitals reviewed.      ASSESSMENT  Diagnoses and all orders for this visit:    Gastroesophageal reflux disease, esophagitis presence not specified  -     Case Request; Standing  -     Case Request    Other orders  -     Implement Anesthesia orders day of " procedure.; Standing  -     Obtain informed consent; Standing          PLAN  No Follow-up on file.    egd    Risks, benefits and alternatives discussed including but not limited to the complications of bleeding, perforation and sedation related problems.

## 2017-12-04 DIAGNOSIS — K21.00 GASTROESOPHAGEAL REFLUX DISEASE WITH ESOPHAGITIS: Primary | ICD-10-CM

## 2017-12-08 DIAGNOSIS — K21.00 REFLUX ESOPHAGITIS: Primary | ICD-10-CM

## 2018-01-31 ENCOUNTER — OFFICE VISIT (OUTPATIENT)
Dept: GASTROENTEROLOGY | Facility: CLINIC | Age: 33
End: 2018-01-31

## 2018-01-31 VITALS
HEIGHT: 68 IN | BODY MASS INDEX: 22.37 KG/M2 | DIASTOLIC BLOOD PRESSURE: 82 MMHG | TEMPERATURE: 98.4 F | WEIGHT: 147.6 LBS | SYSTOLIC BLOOD PRESSURE: 110 MMHG

## 2018-01-31 DIAGNOSIS — K21.9 GASTROESOPHAGEAL REFLUX DISEASE, ESOPHAGITIS PRESENCE NOT SPECIFIED: Primary | ICD-10-CM

## 2018-01-31 PROCEDURE — 99203 OFFICE O/P NEW LOW 30 MIN: CPT | Performed by: INTERNAL MEDICINE

## 2018-01-31 NOTE — PROGRESS NOTES
Chief Complaint   Patient presents with   • Heartburn     Zach Camarena is a 32 y.o. male who presents with  GERD.  He hs had symptoms for over 20 year.  Has episodes of chest pain - happens after eating at times.  He takes nexium 40 mg - take 2 OYC.  Has had a few instances with severe indigestion in the morning.  Worse with inspiration - this is uncommon.  He thinks this has happened when he ate prior to bedtime. Weight has been stable - no blood in stool.  HPI  Past Medical History:   Diagnosis Date   • GERD (gastroesophageal reflux disease)      Past Surgical History:   Procedure Laterality Date   • CLAVICLE SURGERY     • NEPHRECTOMY      as infant   • UPPER GASTROINTESTINAL ENDOSCOPY  2010       Current Outpatient Prescriptions:   •  Esomeprazole Magnesium (NEXIUM PO), Take  by mouth., Disp: , Rfl:   Allergies   Allergen Reactions   • No Known Drug Allergy      Social History     Social History   • Marital status:      Spouse name: N/A   • Number of children: N/A   • Years of education: N/A     Occupational History   • Not on file.     Social History Main Topics   • Smoking status: Former Smoker     Quit date: 2000   • Smokeless tobacco: Current User     Types: Chew   • Alcohol use No   • Drug use: No   • Sexual activity: Yes     Other Topics Concern   • Not on file     Social History Narrative     Family History   Problem Relation Age of Onset   • No Known Problems Mother    • Irregular heart beat Father    • No Known Problems Sister    • No Known Problems Brother    • No Known Problems Daughter    • No Known Problems Son    • Cancer Maternal Uncle    • No Known Problems Brother      Review of Systems   Constitutional: Negative for appetite change and unexpected weight change.   Respiratory: Negative.    Cardiovascular: Negative.    Gastrointestinal: Negative for abdominal pain, blood in stool, nausea and vomiting.   Allergic/Immunologic: Negative.    Hematological: Negative.      Vitals:    01/31/18  1324   BP: 110/82   Temp: 98.4 °F (36.9 °C)     Last Weight    01/31/18  1324   Weight: 67 kg (147 lb 9.6 oz)     Physical Exam   Constitutional: He is oriented to person, place, and time. He appears well-developed and well-nourished.   HENT:   Head: Normocephalic and atraumatic.   Eyes: Conjunctivae are normal. No scleral icterus.   Neck: Normal range of motion. Neck supple.   Cardiovascular: Normal rate and regular rhythm.    Pulmonary/Chest: Effort normal and breath sounds normal.   Abdominal: Soft. Bowel sounds are normal. He exhibits no distension. There is no tenderness.   Musculoskeletal: He exhibits no edema.   Neurological: He is alert and oriented to person, place, and time.   Skin: Skin is warm and dry.   Psychiatric: He has a normal mood and affect.   Nursing note and vitals reviewed.    No images are attached to the encounter.  No notes on file  Zach was seen today for heartburn.    Diagnoses and all orders for this visit:    Gastroesophageal reflux disease, esophagitis presence not specified  -     Case Request; Standing  -     Case Request    Other orders  -     Implement Anesthesia orders day of procedure.; Standing  -     Obtain informed consent; Standing    Plan:  - continue current meds  - plan for egd for further eval - to r/o nj's

## 2018-02-14 ENCOUNTER — ANESTHESIA (OUTPATIENT)
Dept: GASTROENTEROLOGY | Facility: HOSPITAL | Age: 33
End: 2018-02-14

## 2018-02-14 ENCOUNTER — ANESTHESIA EVENT (OUTPATIENT)
Dept: GASTROENTEROLOGY | Facility: HOSPITAL | Age: 33
End: 2018-02-14

## 2018-02-14 ENCOUNTER — HOSPITAL ENCOUNTER (OUTPATIENT)
Facility: HOSPITAL | Age: 33
Setting detail: HOSPITAL OUTPATIENT SURGERY
Discharge: HOME | End: 2018-02-14
Attending: INTERNAL MEDICINE | Admitting: INTERNAL MEDICINE

## 2018-02-14 VITALS
DIASTOLIC BLOOD PRESSURE: 76 MMHG | RESPIRATION RATE: 16 BRPM | OXYGEN SATURATION: 96 % | TEMPERATURE: 97.7 F | HEIGHT: 68 IN | SYSTOLIC BLOOD PRESSURE: 112 MMHG | WEIGHT: 143.44 LBS | BODY MASS INDEX: 21.74 KG/M2 | HEART RATE: 56 BPM

## 2018-02-14 DIAGNOSIS — K21.9 GASTROESOPHAGEAL REFLUX DISEASE, ESOPHAGITIS PRESENCE NOT SPECIFIED: ICD-10-CM

## 2018-02-14 PROCEDURE — 25010000002 PROPOFOL 10 MG/ML EMULSION: Performed by: ANESTHESIOLOGY

## 2018-02-14 PROCEDURE — 88305 TISSUE EXAM BY PATHOLOGIST: CPT | Performed by: INTERNAL MEDICINE

## 2018-02-14 PROCEDURE — S0260 H&P FOR SURGERY: HCPCS | Performed by: INTERNAL MEDICINE

## 2018-02-14 PROCEDURE — 43239 EGD BIOPSY SINGLE/MULTIPLE: CPT | Performed by: INTERNAL MEDICINE

## 2018-02-14 PROCEDURE — 88312 SPECIAL STAINS GROUP 1: CPT | Performed by: INTERNAL MEDICINE

## 2018-02-14 RX ORDER — SODIUM CHLORIDE, SODIUM LACTATE, POTASSIUM CHLORIDE, CALCIUM CHLORIDE 600; 310; 30; 20 MG/100ML; MG/100ML; MG/100ML; MG/100ML
30 INJECTION, SOLUTION INTRAVENOUS CONTINUOUS PRN
Status: DISCONTINUED | OUTPATIENT
Start: 2018-02-14 | End: 2018-02-14

## 2018-02-14 RX ORDER — PROPOFOL 10 MG/ML
VIAL (ML) INTRAVENOUS AS NEEDED
Status: DISCONTINUED | OUTPATIENT
Start: 2018-02-14 | End: 2018-02-14 | Stop reason: SURG

## 2018-02-14 RX ORDER — SODIUM CHLORIDE 9 MG/ML
30 INJECTION, SOLUTION INTRAVENOUS CONTINUOUS PRN
Status: DISCONTINUED | OUTPATIENT
Start: 2018-02-14 | End: 2018-02-14 | Stop reason: HOSPADM

## 2018-02-14 RX ORDER — SODIUM CHLORIDE, SODIUM LACTATE, POTASSIUM CHLORIDE, CALCIUM CHLORIDE 600; 310; 30; 20 MG/100ML; MG/100ML; MG/100ML; MG/100ML
INJECTION, SOLUTION INTRAVENOUS CONTINUOUS PRN
Status: DISCONTINUED | OUTPATIENT
Start: 2018-02-14 | End: 2018-02-14 | Stop reason: SURG

## 2018-02-14 RX ORDER — LIDOCAINE HYDROCHLORIDE 20 MG/ML
INJECTION, SOLUTION INFILTRATION; PERINEURAL AS NEEDED
Status: DISCONTINUED | OUTPATIENT
Start: 2018-02-14 | End: 2018-02-14 | Stop reason: SURG

## 2018-02-14 RX ADMIN — PROPOFOL 150 MG: 10 INJECTION, EMULSION INTRAVENOUS at 11:00

## 2018-02-14 RX ADMIN — LIDOCAINE HYDROCHLORIDE 60 MG: 20 INJECTION, SOLUTION INFILTRATION; PERINEURAL at 11:00

## 2018-02-14 RX ADMIN — SODIUM CHLORIDE 30 ML/HR: 9 INJECTION, SOLUTION INTRAVENOUS at 10:53

## 2018-02-14 RX ADMIN — SODIUM CHLORIDE, POTASSIUM CHLORIDE, SODIUM LACTATE AND CALCIUM CHLORIDE: 600; 310; 30; 20 INJECTION, SOLUTION INTRAVENOUS at 11:00

## 2018-02-14 RX ADMIN — PROPOFOL 150 MG: 10 INJECTION, EMULSION INTRAVENOUS at 11:05

## 2018-02-14 NOTE — ANESTHESIA POSTPROCEDURE EVALUATION
Patient: Zach Camarena    Procedure Summary     Date Anesthesia Start Anesthesia Stop Room / Location    02/14/18 1101 1119  [facility] /  [facility]       Procedure Diagnosis Surgeon Provider    ESOPHAGOGASTRODUODENOSCOPY with biopsies (N/A Esophagus) Gastroesophageal reflux disease, esophagitis presence not specified  (Gastroesophageal reflux disease, esophagitis presence not specified [K21.9]) MD Denis Pineda MD          Anesthesia Type: MAC  Last vitals  BP   112/76 (02/14/18 1141)   Temp   36.5 °C (97.7 °F) (02/14/18 1032)   Pulse   56 (02/14/18 1141)   Resp   16 (02/14/18 1141)     SpO2   96 % (02/14/18 1141)     Post Anesthesia Care and Evaluation    Patient location during evaluation: PHASE II  Patient participation: complete - patient participated  Level of consciousness: awake and alert  Anesthetic complications: No anesthetic complications

## 2018-02-14 NOTE — H&P
"[facility]  Pre Procedure History & Physical    Chief Complaint:   gerd    Subjective     HPI: Zach Camarena is a 32 y.o. male who presents with  GERD.  He hs had symptoms for over 20 year.  Has episodes of chest pain - happens after eating at times.  He takes nexium 40 mg - take 2 OYC.  Has had a few instances with severe indigestion in the morning.  Worse with inspiration - this is uncommon.  He thinks this has happened when he ate prior to bedtime. Weight has been stable - no blood in stool.      Past Medical History:   Past Medical History:   Diagnosis Date   • GERD (gastroesophageal reflux disease)        Past Surgical History:  Past Surgical History:   Procedure Laterality Date   • CLAVICLE SURGERY     • NEPHRECTOMY      as infant   • UPPER GASTROINTESTINAL ENDOSCOPY  2010       Family History:  Family History   Problem Relation Age of Onset   • No Known Problems Mother    • Irregular heart beat Father    • No Known Problems Sister    • No Known Problems Brother    • No Known Problems Daughter    • No Known Problems Son    • Cancer Maternal Uncle    • No Known Problems Brother        Social History:   reports that he quit smoking about 18 years ago. His smokeless tobacco use includes Chew. He reports that he does not drink alcohol or use illicit drugs.    Medications:   Prescriptions Prior to Admission   Medication Sig Dispense Refill Last Dose   • Esomeprazole Magnesium (NEXIUM PO) Take  by mouth.   2/14/2018 at Unknown time       Allergies:  No known drug allergy    ROS:    Pertinent items are noted in HPI, all other systems reviewed and negative     Objective     Blood pressure 126/87, pulse 60, temperature 97.7 °F (36.5 °C), temperature source Oral, resp. rate 15, height 172.7 cm (68\"), weight 65.1 kg (143 lb 7 oz), SpO2 100 %.    Physical Exam   Constitutional: Pt is oriented to person, place, and time and well-developed, well-nourished, and in no distress.   Mouth/Throat: " Oropharynx is clear and moist.   Neck: Normal range of motion.   Cardiovascular: Normal rate, regular rhythm  Pulmonary/Chest: Effort normal   Abdominal: Soft. Nontender  Skin: Skin is warm and dry.   Psychiatric: Mood, memory, affect and judgment normal.     Assessment/Plan     Diagnosis:  gerd    Anticipated Surgical Procedure:  egd    The risks, benefits, and alternatives of this procedure have been discussed with the patient or the responsible party- the patient understands and agrees to proceed.

## 2018-02-14 NOTE — ANESTHESIA PREPROCEDURE EVALUATION
Anesthesia Evaluation     Patient summary reviewed and Nursing notes reviewed                Airway   Mallampati: I  TM distance: <3 FB  Neck ROM: full  no difficulty expected  Dental - normal exam     Pulmonary - negative pulmonary ROS and normal exam   Cardiovascular - negative cardio ROS and normal exam        Neuro/Psych  (+) dizziness/light headedness,     GI/Hepatic/Renal/Endo    (+)  GERD,     Musculoskeletal (-) negative ROS    Abdominal  - normal exam    Bowel sounds: normal.   Substance History - negative use     OB/GYN negative ob/gyn ROS         Other                        Anesthesia Plan    ASA 2     MAC     Anesthetic plan and risks discussed with patient.

## 2018-02-14 NOTE — PLAN OF CARE
Problem: Patient Care Overview (Adult)  Goal: Plan of Care Review  Outcome: Ongoing (interventions implemented as appropriate)   02/14/18 1038   Coping/Psychosocial Response Interventions   Plan Of Care Reviewed With patient   Patient Care Overview   Progress progress toward functional goals as expected     Goal: Adult Individualization and Mutuality  Outcome: Ongoing (interventions implemented as appropriate)    Goal: Discharge Needs Assessment  Outcome: Ongoing (interventions implemented as appropriate)   02/14/18 1038   Discharge Needs Assessment   Concerns To Be Addressed no discharge needs identified   Discharge Disposition home or self-care   Living Environment   Transportation Available car       Problem: GI Endoscopy (Adult)  Goal: Signs and Symptoms of Listed Potential Problems Will be Absent or Manageable (GI Endoscopy)  Outcome: Ongoing (interventions implemented as appropriate)   02/14/18 1038   GI Endoscopy   Problems Present (GI Endoscopy) none

## 2018-02-15 ENCOUNTER — TELEPHONE (OUTPATIENT)
Dept: GASTROENTEROLOGY | Facility: CLINIC | Age: 33
End: 2018-02-15

## 2018-02-15 LAB
CYTO UR: NORMAL
LAB AP CASE REPORT: NORMAL
Lab: NORMAL
PATH REPORT.FINAL DX SPEC: NORMAL
PATH REPORT.GROSS SPEC: NORMAL

## 2018-02-15 NOTE — TELEPHONE ENCOUNTER
Please him know that his stomach biopsies were normal.  Recommend continuing current regimen for acid reflux.  Use Carafate when necessary.  Follow-up with me as needed

## 2018-02-15 NOTE — TELEPHONE ENCOUNTER
Called pt and advised per Dr Kerns that his stomach bx were normal.  She recommends to continue current regimen for acid reflux and use carafate when necessary.  Also advised to f/u as needed. Pt verb understanding.

## 2018-03-19 ENCOUNTER — OFFICE VISIT (OUTPATIENT)
Dept: FAMILY MEDICINE CLINIC | Facility: CLINIC | Age: 33
End: 2018-03-19

## 2018-03-19 VITALS
RESPIRATION RATE: 16 BRPM | TEMPERATURE: 98.4 F | HEART RATE: 69 BPM | HEIGHT: 68 IN | WEIGHT: 150 LBS | SYSTOLIC BLOOD PRESSURE: 106 MMHG | OXYGEN SATURATION: 98 % | DIASTOLIC BLOOD PRESSURE: 70 MMHG | BODY MASS INDEX: 22.73 KG/M2

## 2018-03-19 DIAGNOSIS — R00.2 HEART PALPITATIONS: Primary | ICD-10-CM

## 2018-03-19 PROBLEM — Z23 FLU VACCINE NEED: Status: RESOLVED | Noted: 2017-10-04 | Resolved: 2018-03-19

## 2018-03-19 PROCEDURE — 99214 OFFICE O/P EST MOD 30 MIN: CPT | Performed by: PHYSICIAN ASSISTANT

## 2018-03-19 PROCEDURE — 93000 ELECTROCARDIOGRAM COMPLETE: CPT | Performed by: PHYSICIAN ASSISTANT

## 2018-03-19 NOTE — PROGRESS NOTES
Subjective   Zach Camarena is a 32 y.o. male.   Chief Complaint   Patient presents with   • heart palpatations       History of Present Illness     Keron is a 32-year-old male who presents for heart palpitations.  He was seen at [facility] urgent care on March 10, 2018 with similar symptoms.  States he had an EKG performed at that office visit with normal results.  He was told to follow-up with his PCP. States the palpitations have been occurring off and on for the past few weeks.  He had some lightheaded episodes with the palpitations.  Denied any shortness of air,wheezing,chest pain,or abdominal pain. His father has A-Fib.  Keron's caffeine intake has been 12 oz of coffee in the am,small red bull or another cup of coffee a day.  He might have a coke at lunch.    He hs been drinking more water.  Keron has gained  pounds since  February 2018.      The following portions of the patient's history were reviewed and updated as appropriate: allergies, current medications, past family history, past medical history, past social history and past surgical history.    Review of Systems   Constitutional: Negative.    HENT: Negative.    Eyes: Negative.    Respiratory: Negative.  Negative for cough, shortness of breath and wheezing.    Cardiovascular: Positive for leg swelling. Negative for chest pain and palpitations.   Gastrointestinal: Negative.    Endocrine: Negative.    Genitourinary: Negative.    Musculoskeletal: Negative.    Skin: Negative.    Allergic/Immunologic: Negative.    Neurological: Positive for light-headedness.   Hematological: Negative.    Psychiatric/Behavioral: Negative.    All other systems reviewed and are negative.      Social History     Social History   • Marital status:      Social History Main Topics   • Smoking status: Former Smoker     Quit date: 2000   • Smokeless tobacco: Current User     Types: Chew   • Alcohol use No   • Drug use: No   • Sexual activity: Yes     Other Topics  "Concern   • Not on file     Vitals:    03/19/18 1432   BP: 106/70   BP Location: Right arm   Patient Position: Sitting   Cuff Size: Adult   Pulse: 69   Resp: 16   Temp: 98.4 °F (36.9 °C)   TempSrc: Oral   SpO2: 98%   Weight: 68 kg (150 lb)   Height: 172.7 cm (68\")     Body mass index is 22.81 kg/m².  Allergies   Allergen Reactions   • No Known Drug Allergy      Wt Readings from Last 3 Encounters:   03/19/18 68 kg (150 lb)   02/14/18 65.1 kg (143 lb 7 oz)   01/31/18 67 kg (147 lb 9.6 oz)       BP Readings from Last 3 Encounters:   03/19/18 106/70   02/14/18 112/76   01/31/18 110/82     Objective   Physical Exam   Constitutional: He is oriented to person, place, and time. Vital signs are normal. He appears well-developed and well-nourished.   HENT:   Head: Normocephalic and atraumatic.   Right Ear: Hearing, tympanic membrane, external ear and ear canal normal.   Left Ear: Hearing, tympanic membrane, external ear and ear canal normal.   Nose: Nose normal. Right sinus exhibits no maxillary sinus tenderness and no frontal sinus tenderness. Left sinus exhibits no maxillary sinus tenderness and no frontal sinus tenderness.   Mouth/Throat: Uvula is midline, oropharynx is clear and moist and mucous membranes are normal.   Eyes: Conjunctivae and lids are normal.   Neck: Trachea normal and phonation normal. Neck supple. Carotid bruit is not present. No edema present. No thyroid mass and no thyromegaly present.   Cardiovascular: Normal rate, regular rhythm, S1 normal, S2 normal, normal heart sounds, intact distal pulses and normal pulses.    Pulmonary/Chest: Effort normal and breath sounds normal.   Abdominal: Soft. Normal appearance, normal aorta and bowel sounds are normal. There is no hepatomegaly. There is no tenderness.   Lymphadenopathy:     He has no cervical adenopathy.   Neurological: He is alert and oriented to person, place, and time. He has normal reflexes.   Skin: Skin is warm, dry and intact.   Psychiatric: He has " a normal mood and affect. His speech is normal and behavior is normal. Judgment and thought content normal. Cognition and memory are normal.           ECG 12 Lead  Date/Time: 3/19/2018 2:41 PM  Performed by: VIRGIE BURCH  Authorized by: VIRGIE BURCH   Comparison: not compared with previous ECG   Rhythm: sinus rhythm  Rate: normal  BPM: 58  Conduction: conduction normal  ST Segments: ST segments normal  T Waves: T waves normal  QRS axis: normal  Clinical impression: normal ECG  Comments: Indication Heart palpitation  P/CT:  108/138 ms  QRS:  88 ms  QT/QTc:  392/385 ms            Assessment/Plan   Zach was seen today for heart palpatations.    Diagnoses and all orders for this visit:    Heart palpitations  -     ECG 12 Lead  -     Holter Monitor - 24 Hour; Future  -     CBC & Differential  -     Thyroid Panel With TSH  -     Comprehensive Metabolic Panel      Mr. Zach Camarena was in office today with new onset heart palpitations.  I have reviewed his [place] urgent care notes with him at today's office visit.  He had an EKG in office today which showed normal sinus rhythm.  I will schedule a 24-hour Holter monitor for further evaluation of his symptoms.  A CBC, CMP and a thyroid profile with TSH was collected at today's office visit for further evaluation of heart palpitations.  He will be notified of test results when completed.  I have encouraged him to decrease his caffeine intake as well.  We'll consider possible referral to cardiology in future if oriented.

## 2018-03-20 LAB
ALBUMIN SERPL-MCNC: 4.6 G/DL (ref 3.5–5.2)
ALBUMIN/GLOB SERPL: 1.9 G/DL
ALP SERPL-CCNC: 74 U/L (ref 40–129)
ALT SERPL-CCNC: 15 U/L (ref 5–41)
AST SERPL-CCNC: 21 U/L (ref 5–40)
BASOPHILS # BLD AUTO: 0.03 10*3/MM3 (ref 0–0.2)
BASOPHILS NFR BLD AUTO: 0.6 % (ref 0–2)
BILIRUB SERPL-MCNC: 0.4 MG/DL (ref 0.2–1.2)
BUN SERPL-MCNC: 13 MG/DL (ref 6–20)
BUN/CREAT SERPL: 10.8 (ref 7–25)
CALCIUM SERPL-MCNC: 10.3 MG/DL (ref 8.6–10.5)
CHLORIDE SERPL-SCNC: 100 MMOL/L (ref 98–107)
CO2 SERPL-SCNC: 30.2 MMOL/L (ref 22–29)
CREAT SERPL-MCNC: 1.2 MG/DL (ref 0.76–1.27)
EOSINOPHIL # BLD AUTO: 0.13 10*3/MM3 (ref 0.1–0.3)
EOSINOPHIL NFR BLD AUTO: 2.5 % (ref 0–4)
ERYTHROCYTE [DISTWIDTH] IN BLOOD BY AUTOMATED COUNT: 12.2 % (ref 11.5–14.5)
FT4I SERPL CALC-MCNC: 1.4 (ref 1.2–4.9)
GFR SERPLBLD CREATININE-BSD FMLA CKD-EPI: 69 ML/MIN/1.73
GFR SERPLBLD CREATININE-BSD FMLA CKD-EPI: 84 ML/MIN/1.73
GLOBULIN SER CALC-MCNC: 2.4 GM/DL
GLUCOSE SERPL-MCNC: 77 MG/DL (ref 65–99)
HCT VFR BLD AUTO: 46.2 % (ref 42–52)
HGB BLD-MCNC: 15.8 G/DL (ref 14–18)
IMM GRANULOCYTES # BLD: 0.03 10*3/MM3 (ref 0–0.03)
IMM GRANULOCYTES NFR BLD: 0.6 % (ref 0–0.5)
LYMPHOCYTES # BLD AUTO: 1.94 10*3/MM3 (ref 0.6–4.8)
LYMPHOCYTES NFR BLD AUTO: 36.9 % (ref 20–45)
MCH RBC QN AUTO: 29.8 PG (ref 27–31)
MCHC RBC AUTO-ENTMCNC: 34.2 G/DL (ref 31–37)
MCV RBC AUTO: 87 FL (ref 80–94)
MONOCYTES # BLD AUTO: 0.52 10*3/MM3 (ref 0–1)
MONOCYTES NFR BLD AUTO: 9.9 % (ref 3–8)
NEUTROPHILS # BLD AUTO: 2.61 10*3/MM3 (ref 1.5–8.3)
NEUTROPHILS NFR BLD AUTO: 49.5 % (ref 45–70)
NRBC BLD AUTO-RTO: 0 /100 WBC (ref 0–0)
PLATELET # BLD AUTO: 232 10*3/MM3 (ref 140–500)
POTASSIUM SERPL-SCNC: 4.6 MMOL/L (ref 3.5–5.2)
PROT SERPL-MCNC: 7 G/DL (ref 6–8.5)
RBC # BLD AUTO: 5.31 10*6/MM3 (ref 4.7–6.1)
SODIUM SERPL-SCNC: 141 MMOL/L (ref 136–145)
T3RU NFR SERPL: 24 % (ref 24–39)
T4 SERPL-MCNC: 6 UG/DL (ref 4.5–12)
TSH SERPL DL<=0.005 MIU/L-ACNC: 1.95 UIU/ML (ref 0.45–4.5)
WBC # BLD AUTO: 5.26 10*3/MM3 (ref 4.8–10.8)

## 2018-03-26 ENCOUNTER — HOSPITAL ENCOUNTER (OUTPATIENT)
Dept: CARDIOLOGY | Facility: HOSPITAL | Age: 33
Discharge: HOME | End: 2018-03-26
Admitting: PHYSICIAN ASSISTANT

## 2018-03-26 DIAGNOSIS — R00.2 HEART PALPITATIONS: ICD-10-CM

## 2018-03-26 PROCEDURE — 93225 XTRNL ECG REC<48 HRS REC: CPT

## 2018-03-26 PROCEDURE — 93226 XTRNL ECG REC<48 HR SCAN A/R: CPT

## 2018-03-29 PROCEDURE — 93227 XTRNL ECG REC<48 HR R&I: CPT | Performed by: INTERNAL MEDICINE

## 2018-07-19 ENCOUNTER — CLINICAL SUPPORT (OUTPATIENT)
Dept: FAMILY MEDICINE CLINIC | Facility: CLINIC | Age: 33
End: 2018-07-19

## 2018-07-19 DIAGNOSIS — Z23 NEED FOR HEPATITIS VACCINATION: Primary | ICD-10-CM

## 2018-07-19 PROCEDURE — 90632 HEPA VACCINE ADULT IM: CPT | Performed by: PHYSICIAN ASSISTANT

## 2018-07-19 PROCEDURE — 90471 IMMUNIZATION ADMIN: CPT | Performed by: PHYSICIAN ASSISTANT

## 2019-01-27 ENCOUNTER — HOSPITAL ENCOUNTER (EMERGENCY)
Facility: HOSPITAL | Age: 34
Discharge: HOME | End: 2019-01-27
Attending: EMERGENCY MEDICINE | Admitting: EMERGENCY MEDICINE

## 2019-01-27 VITALS
SYSTOLIC BLOOD PRESSURE: 136 MMHG | HEART RATE: 85 BPM | HEIGHT: 68 IN | OXYGEN SATURATION: 99 % | DIASTOLIC BLOOD PRESSURE: 97 MMHG | RESPIRATION RATE: 16 BRPM | TEMPERATURE: 98 F | WEIGHT: 150 LBS | BODY MASS INDEX: 22.73 KG/M2

## 2019-01-27 DIAGNOSIS — T14.8XXA PUNCTURE WOUND: Primary | ICD-10-CM

## 2019-01-27 LAB
HAV IGM SERPL QL IA: NORMAL
HBV CORE IGM SERPL QL IA: NORMAL
HBV SURFACE AG SERPL QL IA: NORMAL
HCV AB SER DONR QL: NORMAL
HIV1 P24 AG SER QL: NORMAL
HIV1+2 AB SER QL: NEGATIVE

## 2019-01-27 PROCEDURE — 99283 EMERGENCY DEPT VISIT LOW MDM: CPT

## 2019-01-27 PROCEDURE — 99282 EMERGENCY DEPT VISIT SF MDM: CPT | Performed by: EMERGENCY MEDICINE

## 2019-01-27 PROCEDURE — G0432 EIA HIV-1/HIV-2 SCREEN: HCPCS | Performed by: EMERGENCY MEDICINE

## 2019-01-27 PROCEDURE — 80074 ACUTE HEPATITIS PANEL: CPT | Performed by: EMERGENCY MEDICINE

## 2019-01-27 PROCEDURE — 87899 AGENT NOS ASSAY W/OPTIC: CPT | Performed by: EMERGENCY MEDICINE

## 2019-02-17 ENCOUNTER — HOSPITAL ENCOUNTER (EMERGENCY)
Facility: HOSPITAL | Age: 34
End: 2019-02-17

## 2019-02-20 ENCOUNTER — HOSPITAL ENCOUNTER (OUTPATIENT)
Dept: GENERAL RADIOLOGY | Facility: HOSPITAL | Age: 34
Discharge: HOME | End: 2019-02-20
Admitting: PHYSICIAN ASSISTANT

## 2019-02-20 ENCOUNTER — OFFICE VISIT (OUTPATIENT)
Dept: FAMILY MEDICINE CLINIC | Facility: CLINIC | Age: 34
End: 2019-02-20

## 2019-02-20 VITALS
HEART RATE: 62 BPM | RESPIRATION RATE: 16 BRPM | HEIGHT: 68 IN | TEMPERATURE: 98.5 F | DIASTOLIC BLOOD PRESSURE: 64 MMHG | SYSTOLIC BLOOD PRESSURE: 102 MMHG | WEIGHT: 145 LBS | OXYGEN SATURATION: 98 % | BODY MASS INDEX: 21.98 KG/M2

## 2019-02-20 DIAGNOSIS — R07.89 MID STERNAL CHEST PAIN: ICD-10-CM

## 2019-02-20 DIAGNOSIS — R22.2 MASS OF CHEST WALL, BILATERAL: ICD-10-CM

## 2019-02-20 DIAGNOSIS — Z00.00 ANNUAL PHYSICAL EXAM: Primary | ICD-10-CM

## 2019-02-20 DIAGNOSIS — I49.8 SINUS ARRHYTHMIA SEEN ON ELECTROCARDIOGRAM: ICD-10-CM

## 2019-02-20 LAB
BILIRUB BLD-MCNC: NEGATIVE MG/DL
CLARITY, POC: CLEAR
COLOR UR: YELLOW
GLUCOSE UR STRIP-MCNC: NEGATIVE MG/DL
KETONES UR QL: NEGATIVE
LEUKOCYTE EST, POC: NEGATIVE
NITRITE UR-MCNC: NEGATIVE MG/ML
PH UR: 7 [PH] (ref 5–8)
PROT UR STRIP-MCNC: ABNORMAL MG/DL
RBC # UR STRIP: NEGATIVE /UL
SP GR UR: 1.01 (ref 1–1.03)
UROBILINOGEN UR QL: NORMAL

## 2019-02-20 PROCEDURE — 71046 X-RAY EXAM CHEST 2 VIEWS: CPT

## 2019-02-20 PROCEDURE — 93000 ELECTROCARDIOGRAM COMPLETE: CPT | Performed by: PHYSICIAN ASSISTANT

## 2019-02-20 PROCEDURE — 99395 PREV VISIT EST AGE 18-39: CPT | Performed by: PHYSICIAN ASSISTANT

## 2019-02-20 PROCEDURE — 81002 URINALYSIS NONAUTO W/O SCOPE: CPT | Performed by: PHYSICIAN ASSISTANT

## 2019-02-20 NOTE — PROGRESS NOTES
Subjective   Zach Camarena is a 33 y.o. male presents for   Chief Complaint   Patient presents with   • Annual Exam       History of Present Illness     Zach is a 33-year-old male who presents for annual physical examination. He has lost 5 pounds since January 27,2019.  States he is feeling well at office visit today.  He did have one episode of substernal chest pain last week.  States he noticed the chest pain occurring 2 days after drinking 2-3 months to sports drinks.  He has a history of reflux and takes Nexium daily.  He had an EGD performed last year by Dr. Kavya Kerns with  normal results.  Keron drinks approximately 24 ounces of coffee a day.  Denied any shortness of air, dizziness, lightheadedness, nausea, vomiting or dizziness with the chest pain.  The chest pain had a duration of a few minutes.  Denied any radiation of pain to neck or down the arms.  Appetite and sleep have been normal.  Keron stays active by running.  Bowel movements are daily without dark black tarry stools.  In office visit today he denied any active chest pain, shortness of air, dizziness, vision changes, urinary symptoms, or upper respiratory symptoms.  He does use smokeless tobacco    The following portions of the patient's history were reviewed and updated as appropriate: allergies, current medications, past family history, past medical history, past social history, past surgical history and problem list.    Review of Systems   Constitutional: Negative.  Negative for chills, fatigue and fever.   HENT: Negative.  Negative for congestion, ear pain, nosebleeds, postnasal drip, rhinorrhea, sinus pressure, sinus pain, sneezing and sore throat.    Eyes: Negative.    Respiratory: Negative.  Negative for cough, shortness of breath and wheezing.    Cardiovascular: Positive for chest pain. Negative for palpitations and leg swelling.   Gastrointestinal: Negative.  Negative for constipation, diarrhea, nausea and vomiting.   Endocrine:  "Negative.    Genitourinary: Negative.  Negative for dysuria, frequency and hematuria.   Musculoskeletal: Negative.    Skin: Negative.    Allergic/Immunologic: Negative.    Neurological: Negative.    Hematological: Negative.    Psychiatric/Behavioral: Negative.  Negative for sleep disturbance and suicidal ideas.   All other systems reviewed and are negative.        Vitals:    19 1452   BP: 102/64   BP Location: Left arm   Patient Position: Sitting   Cuff Size: Adult   Pulse: 62   Resp: 16   Temp: 98.5 °F (36.9 °C)   TempSrc: Oral   SpO2: 98%   Weight: 65.8 kg (145 lb)   Height: 172.7 cm (68\")     Wt Readings from Last 3 Encounters:   19 65.8 kg (145 lb)   19 68 kg (150 lb)   18 68 kg (150 lb)       BP Readings from Last 3 Encounters:   19 102/64   19 136/97   18 106/70     Social History     Socioeconomic History   • Marital status:      Spouse name: Not on file   • Number of children: Not on file   • Years of education: Not on file   • Highest education level: Not on file   Social Needs   • Financial resource strain: Not on file   • Food insecurity - worry: Not on file   • Food insecurity - inability: Not on file   • Transportation needs - medical: Not on file   • Transportation needs - non-medical: Not on file   Occupational History   • Not on file   Tobacco Use   • Smoking status: Former Smoker     Last attempt to quit: 2000     Years since quittin.1   • Smokeless tobacco: Current User     Types: Chew   Substance and Sexual Activity   • Alcohol use: No   • Drug use: No   • Sexual activity: Yes   Other Topics Concern   • Not on file   Social History Narrative   • Not on file       Allergies   Allergen Reactions   • No Known Drug Allergy        Body mass index is 22.05 kg/m².    Objective   Physical Exam   Constitutional: He is oriented to person, place, and time. Vital signs are normal. He appears well-developed and well-nourished.   HENT:   Head: Normocephalic " and atraumatic.   Right Ear: Hearing, tympanic membrane, external ear and ear canal normal.   Left Ear: Hearing, tympanic membrane, external ear and ear canal normal.   Nose: Nose normal. Right sinus exhibits no maxillary sinus tenderness and no frontal sinus tenderness. Left sinus exhibits no maxillary sinus tenderness and no frontal sinus tenderness.   Mouth/Throat: Uvula is midline, oropharynx is clear and moist and mucous membranes are normal.   Eyes: Conjunctivae, EOM and lids are normal. Pupils are equal, round, and reactive to light.   Neck: Trachea normal, normal range of motion and phonation normal. Neck supple. No thyroid mass and no thyromegaly present.   Cardiovascular: Normal rate, regular rhythm, S1 normal, S2 normal, normal heart sounds and normal pulses.   Pulmonary/Chest: Effort normal and breath sounds normal. He exhibits mass and tenderness.   There are approximately 2 soft tissue masses noted bilaterally on chest.  These are slightly tender to palpation.  They measure roughly 2 cm x 1 cm and a oblong shaped area.       Abdominal: Soft. Normal appearance and bowel sounds are normal. There is no hepatomegaly. There is no tenderness. No hernia. Hernia confirmed negative in the right inguinal area and confirmed negative in the left inguinal area.   Genitourinary: Testes normal and penis normal. Cremasteric reflex is present. Circumcised.   Genitourinary Comments: Exam was chaperoned by Sophia Beaulieu RN   Lymphadenopathy:     He has no cervical adenopathy. No inguinal adenopathy noted on the right or left side.   Neurological: He is alert and oriented to person, place, and time. He has normal reflexes.   Skin: Skin is warm, dry and intact.   Psychiatric: He has a normal mood and affect. His speech is normal and behavior is normal. Judgment and thought content normal. Cognition and memory are normal.           ECG 12 Lead  Date/Time: 2/20/2019 3:26 PM  Performed by: Tashia Liu PA-C Authorized  by: Tashia Liu PA-C   Comparison: compared with previous ECG from 3/19/2018  Comparison to previous ECG: New sinus bradycardia with sinus arrhythmia  Rhythm: sinus bradycardia  Rate: normal  BPM: 58  QRS axis: normal    Clinical impression: abnormal EKG  Comments: Indication: Sternal chest pain  KY:  149 ms  QRS 80  QT/QTc:  393/389 ms              Results for orders placed or performed in visit on 02/20/19   POCT urinalysis dipstick, manual   Result Value Ref Range    Color Yellow Yellow, Straw, Dark Yellow, Alida    Clarity, UA Clear Clear    Glucose, UA Negative Negative, 1000 mg/dL (3+) mg/dL    Bilirubin Negative Negative    Ketones, UA Negative Negative    Specific Gravity  1.010 1.005 - 1.030    Blood, UA Negative Negative    pH, Urine 7.0 5.0 - 8.0    Protein, POC Trace (A) Negative mg/dL    Urobilinogen, UA Normal Normal    Leukocytes Negative Negative    Nitrite, UA Negative Negative     Assessment/Plan   Zach was seen today for annual exam.    Diagnoses and all orders for this visit:    Annual physical exam  -     CBC & Differential  -     Comprehensive Metabolic Panel  -     Lipid Panel With LDL / HDL Ratio    Mid sternal chest pain  -     XR Chest PA & Lateral; Future  -     ECG 12 Lead  -     Holter Monitor - 24 Hour; Future    Mass of chest wall, bilateral  -     XR Chest PA & Lateral; Future    Sinus arrhythmia seen on electrocardiogram  -     Thyroid Panel With TSH  -     Holter Monitor - 24 Hour; Future    1.  Annual physical examination: Keron would like to have blood work done at office visit today.  He did have a couple sips of coffee with cream and sugar a few hours ago. In office urine showed trace protein. He will have a CBC, CMP and a lipid profile collected office visit today.  He will be notified of test results when completed.  2.  New mid sternal chest pain with new sinus arrhythmia on EKG: Keron had an EKG performed at office visit today.  He has sinus bradycardia with new sinus  arrhythmia.  I suspect this may be related to his caffeine/monster sports drinks.  He will have a Holter monitor performed at [facility].  He will have a chest x-ray to [facility] today for further evaluation.  In addition to above blood work you will have a thyroid profile with TSH collected.  He will be notified of results when completed.  I have encouraged him to decrease his caffeine/sports drinks.  We will consider referral to cardiology in future if warranted.  He is only had one episode of chest pain that occurred last week.  3.  New bilateral chest wall mass: Keron has a soft tissue mass bilaterally right under his bilateral clavicles.  He will have a chest x-ray at the [facility] facility today.  We will consider CT of chest in future if warranted.      Tashia Liu PA-C    [facility]  [address] [place] [address]  Dept: [phone]  Dept Fax: [phone]  Loc: [phone]  Loc Fax: [phone]

## 2019-02-21 DIAGNOSIS — R22.2 MASS OF CHEST WALL, BILATERAL: Primary | ICD-10-CM

## 2019-02-21 LAB
ALBUMIN SERPL-MCNC: 5 G/DL (ref 3.5–5.2)
ALBUMIN/GLOB SERPL: 2 G/DL
ALP SERPL-CCNC: 79 U/L (ref 40–129)
ALT SERPL-CCNC: 24 U/L (ref 5–41)
AST SERPL-CCNC: 28 U/L (ref 5–40)
BASOPHILS # BLD AUTO: 0.03 10*3/MM3 (ref 0–0.2)
BASOPHILS NFR BLD AUTO: 0.6 % (ref 0–1.5)
BILIRUB SERPL-MCNC: 0.5 MG/DL (ref 0.2–1.2)
BUN SERPL-MCNC: 13 MG/DL (ref 6–20)
BUN/CREAT SERPL: 10.6 (ref 7–25)
CALCIUM SERPL-MCNC: 10.1 MG/DL (ref 8.6–10.5)
CHLORIDE SERPL-SCNC: 100 MMOL/L (ref 98–107)
CHOLEST SERPL-MCNC: 229 MG/DL (ref 0–200)
CO2 SERPL-SCNC: 31.3 MMOL/L (ref 22–29)
CREAT SERPL-MCNC: 1.23 MG/DL (ref 0.76–1.27)
EOSINOPHIL # BLD AUTO: 0.2 10*3/MM3 (ref 0–0.4)
EOSINOPHIL NFR BLD AUTO: 4.3 % (ref 0.3–6.2)
ERYTHROCYTE [DISTWIDTH] IN BLOOD BY AUTOMATED COUNT: 12.2 % (ref 12.3–15.4)
FT4I SERPL CALC-MCNC: 1.7 (ref 1.2–4.9)
GLOBULIN SER CALC-MCNC: 2.5 GM/DL
GLUCOSE SERPL-MCNC: 89 MG/DL (ref 65–99)
HCT VFR BLD AUTO: 44.6 % (ref 37.5–51)
HDLC SERPL-MCNC: 60 MG/DL (ref 40–60)
HGB BLD-MCNC: 15.7 G/DL (ref 13–17.7)
IMM GRANULOCYTES # BLD AUTO: 0.01 10*3/MM3 (ref 0–0.05)
IMM GRANULOCYTES NFR BLD AUTO: 0.2 % (ref 0–0.5)
LDLC SERPL CALC-MCNC: 147 MG/DL (ref 0–100)
LDLC/HDLC SERPL: 2.45 {RATIO}
LYMPHOCYTES # BLD AUTO: 1.63 10*3/MM3 (ref 0.7–3.1)
LYMPHOCYTES NFR BLD AUTO: 34.7 % (ref 19.6–45.3)
MCH RBC QN AUTO: 29.6 PG (ref 26.6–33)
MCHC RBC AUTO-ENTMCNC: 35.2 G/DL (ref 31.5–35.7)
MCV RBC AUTO: 84 FL (ref 79–97)
MONOCYTES # BLD AUTO: 0.82 10*3/MM3 (ref 0.1–0.9)
MONOCYTES NFR BLD AUTO: 17.4 % (ref 5–12)
NEUTROPHILS # BLD AUTO: 2.01 10*3/MM3 (ref 1.4–7)
NEUTROPHILS NFR BLD AUTO: 42.8 % (ref 42.7–76)
NRBC BLD AUTO-RTO: 0 /100 WBC (ref 0–0)
PLATELET # BLD AUTO: 235 10*3/MM3 (ref 140–450)
POTASSIUM SERPL-SCNC: 5.2 MMOL/L (ref 3.5–5.2)
PROT SERPL-MCNC: 7.5 G/DL (ref 6–8.5)
RBC # BLD AUTO: 5.31 10*6/MM3 (ref 4.14–5.8)
SODIUM SERPL-SCNC: 140 MMOL/L (ref 136–145)
T3RU NFR SERPL: 24 % (ref 24–39)
T4 SERPL-MCNC: 6.9 UG/DL (ref 4.5–12)
TRIGL SERPL-MCNC: 111 MG/DL (ref 0–150)
TSH SERPL DL<=0.005 MIU/L-ACNC: 3.5 UIU/ML (ref 0.45–4.5)
VLDLC SERPL CALC-MCNC: 22.2 MG/DL (ref 8–32)
WBC # BLD AUTO: 4.7 10*3/MM3 (ref 3.4–10.8)

## 2019-02-26 ENCOUNTER — APPOINTMENT (OUTPATIENT)
Dept: CT IMAGING | Facility: HOSPITAL | Age: 34
End: 2019-02-26

## 2019-02-26 ENCOUNTER — APPOINTMENT (OUTPATIENT)
Dept: CARDIOLOGY | Facility: HOSPITAL | Age: 34
End: 2019-02-26

## 2019-02-28 DIAGNOSIS — R07.89 MID STERNAL CHEST PAIN: Primary | ICD-10-CM

## 2019-02-28 DIAGNOSIS — R22.2 MASS OF CHEST WALL, BILATERAL: ICD-10-CM

## 2019-03-06 ENCOUNTER — HOSPITAL ENCOUNTER (OUTPATIENT)
Dept: ULTRASOUND IMAGING | Facility: HOSPITAL | Age: 34
Discharge: HOME | End: 2019-03-06

## 2019-03-06 ENCOUNTER — APPOINTMENT (OUTPATIENT)
Dept: CT IMAGING | Facility: HOSPITAL | Age: 34
End: 2019-03-06

## 2019-03-06 ENCOUNTER — HOSPITAL ENCOUNTER (OUTPATIENT)
Dept: CARDIOLOGY | Facility: HOSPITAL | Age: 34
Setting detail: HOSPITAL OUTPATIENT SURGERY
Discharge: HOME | End: 2019-03-06
Admitting: PHYSICIAN ASSISTANT

## 2019-03-06 DIAGNOSIS — R22.2 MASS OF CHEST WALL, BILATERAL: ICD-10-CM

## 2019-03-06 DIAGNOSIS — R07.89 MID STERNAL CHEST PAIN: ICD-10-CM

## 2019-03-06 DIAGNOSIS — I49.8 SINUS ARRHYTHMIA SEEN ON ELECTROCARDIOGRAM: ICD-10-CM

## 2019-03-06 PROCEDURE — 76604 US EXAM CHEST: CPT

## 2019-03-06 PROCEDURE — 93226 XTRNL ECG REC<48 HR SCAN A/R: CPT

## 2019-03-06 PROCEDURE — 93225 XTRNL ECG REC<48 HRS REC: CPT

## 2019-03-12 PROCEDURE — 93227 XTRNL ECG REC<48 HR R&I: CPT | Performed by: INTERNAL MEDICINE

## 2019-05-20 ENCOUNTER — TELEPHONE (OUTPATIENT)
Dept: FAMILY MEDICINE CLINIC | Facility: CLINIC | Age: 34
End: 2019-05-20

## 2019-05-20 DIAGNOSIS — L30.9 DERMATITIS: Primary | ICD-10-CM

## 2019-05-20 NOTE — TELEPHONE ENCOUNTER
Pt calling, states he has a dermatitis-like rash around nose and eyes. Was wondering if we could call in a cream or something for him?   Please advise.

## 2019-05-20 NOTE — TELEPHONE ENCOUNTER
Notify patient that I have samples of Eucrisa for him to try.  He will apply to the affected area twice a day as needed.

## 2019-06-27 ENCOUNTER — TELEPHONE (OUTPATIENT)
Dept: FAMILY MEDICINE CLINIC | Facility: CLINIC | Age: 34
End: 2019-06-27

## 2019-06-27 DIAGNOSIS — L30.9 DERMATITIS: ICD-10-CM

## 2019-06-27 NOTE — TELEPHONE ENCOUNTER
There is no cure for eczema.  He certainly can see a dermatologist and may be they can create a more comprehensive regimen but he will likely be putting up with this for many years.

## 2019-06-27 NOTE — TELEPHONE ENCOUNTER
Pt calling requesting eucrisa refill, this has been sent in for him.    Pt mentioned if he misses using this medication even once, his rash/issue instantly returns. Pt would like to know if he would benefit from seeing a dermatologist?    Please advise.

## 2019-06-28 NOTE — TELEPHONE ENCOUNTER
Spoke with pt about probable permanent issue. Pt would like a referral to Dermatology. Please input order.

## 2019-06-30 DIAGNOSIS — L30.9 DERMATITIS: Primary | ICD-10-CM

## 2019-07-01 ENCOUNTER — TELEPHONE (OUTPATIENT)
Dept: FAMILY MEDICINE CLINIC | Facility: CLINIC | Age: 34
End: 2019-07-01

## 2019-07-01 DIAGNOSIS — L30.9 DERMATITIS: ICD-10-CM

## 2019-07-03 DIAGNOSIS — L30.9 DERMATITIS: ICD-10-CM

## 2019-07-04 DIAGNOSIS — L30.9 DERMATITIS: ICD-10-CM

## 2020-02-26 ENCOUNTER — OFFICE VISIT (OUTPATIENT)
Dept: FAMILY MEDICINE CLINIC | Facility: CLINIC | Age: 35
End: 2020-02-26

## 2020-02-26 VITALS
HEIGHT: 68 IN | WEIGHT: 146 LBS | SYSTOLIC BLOOD PRESSURE: 124 MMHG | TEMPERATURE: 98.2 F | BODY MASS INDEX: 22.13 KG/M2 | RESPIRATION RATE: 16 BRPM | HEART RATE: 68 BPM | DIASTOLIC BLOOD PRESSURE: 78 MMHG | OXYGEN SATURATION: 98 %

## 2020-02-26 DIAGNOSIS — Z00.00 ENCOUNTER FOR ANNUAL PHYSICAL EXAM: Primary | ICD-10-CM

## 2020-02-26 DIAGNOSIS — J02.9 SORE THROAT: Primary | ICD-10-CM

## 2020-02-26 DIAGNOSIS — R80.9 PROTEINURIA, UNSPECIFIED TYPE: ICD-10-CM

## 2020-02-26 LAB
EXPIRATION DATE: NORMAL
INTERNAL CONTROL: NORMAL
Lab: NORMAL
S PYO AG THROAT QL: NEGATIVE

## 2020-02-26 PROCEDURE — 87880 STREP A ASSAY W/OPTIC: CPT | Performed by: PHYSICIAN ASSISTANT

## 2020-02-26 PROCEDURE — 99213 OFFICE O/P EST LOW 20 MIN: CPT | Performed by: PHYSICIAN ASSISTANT

## 2020-02-26 RX ORDER — KETOCONAZOLE 20 MG/ML
SHAMPOO TOPICAL
COMMUNITY
Start: 2020-01-16

## 2020-02-26 RX ORDER — MOMETASONE FUROATE 1 MG/G
CREAM TOPICAL
COMMUNITY
Start: 2020-01-16

## 2020-02-26 NOTE — PROGRESS NOTES
"Subjective   Zach Camarena is a 34 y.o. male presents for   Chief Complaint   Patient presents with   • Cough     during night   • Sore Throat       History of Present Illness     Keron is a 34 year old male who presents with a sore throat,dry cough, post nasal drip,fatigue,headaches,and stuffy nose to clear rhinorrhea for the past 3-4 days.  States that he has not taken any OTC medication.  Denied any fevers,chills, wheezing, shortness of air, GI upset, ear pain, body aches, asked.  Keron has not taken any over-the-counter medication.  Appetite and sleep have been normal.  States he does not like to take medications.  Did have a flu shot in the fall 2019.    The following portions of the patient's history were reviewed and updated as appropriate: allergies, current medications, past family history, past medical history, past social history, past surgical history and problem list.    Review of Systems   Constitutional: Positive for fatigue. Negative for chills and fever.   HENT: Positive for congestion, postnasal drip, rhinorrhea and sore throat. Negative for ear pain, sinus pressure and sinus pain.    Eyes: Negative.    Respiratory: Positive for cough. Negative for shortness of breath and wheezing.    Cardiovascular: Negative.  Negative for chest pain, palpitations and leg swelling.   Gastrointestinal: Negative.  Negative for constipation, diarrhea, nausea and vomiting.   Endocrine: Negative.    Genitourinary: Negative.    Musculoskeletal: Negative.  Negative for myalgias.   Skin: Negative.    Allergic/Immunologic: Negative.    Neurological: Negative.  Negative for dizziness, light-headedness and headaches.   Psychiatric/Behavioral: Negative.  Negative for sleep disturbance.         Vitals:    02/26/20 1441   BP: 124/78   Pulse: 68   Resp: 16   Temp: 98.2 °F (36.8 °C)   SpO2: 98%   Weight: 66.2 kg (146 lb)   Height: 172.7 cm (68\")     Wt Readings from Last 3 Encounters:   02/26/20 66.2 kg (146 lb)   02/20/19 65.8 " kg (145 lb)   19 68 kg (150 lb)     BP Readings from Last 3 Encounters:   20 124/78   19 102/64   19 136/97     Social History     Socioeconomic History   • Marital status:      Spouse name: Not on file   • Number of children: Not on file   • Years of education: Not on file   • Highest education level: Not on file   Tobacco Use   • Smoking status: Former Smoker     Last attempt to quit: 2000     Years since quittin.1   • Smokeless tobacco: Current User     Types: Chew   Substance and Sexual Activity   • Alcohol use: No   • Drug use: No   • Sexual activity: Yes       Allergies   Allergen Reactions   • No Known Drug Allergy        Body mass index is 22.2 kg/m².    Objective   Physical Exam   Constitutional: He is oriented to person, place, and time. Vital signs are normal. He appears well-developed and well-nourished.   HENT:   Head: Normocephalic and atraumatic.   Right Ear: Hearing, tympanic membrane, external ear and ear canal normal.   Left Ear: Hearing, tympanic membrane, external ear and ear canal normal.   Nose: Nose normal. Right sinus exhibits no maxillary sinus tenderness and no frontal sinus tenderness. Left sinus exhibits no maxillary sinus tenderness and no frontal sinus tenderness.   Mouth/Throat: Uvula is midline and mucous membranes are normal. Posterior oropharyngeal erythema present. No posterior oropharyngeal edema.   Eyes: Pupils are equal, round, and reactive to light. Conjunctivae, EOM and lids are normal.   Neck: Trachea normal and phonation normal. Neck supple. No tracheal tenderness present. No tracheal deviation and no edema present.   Cardiovascular: Normal rate, regular rhythm, S1 normal, S2 normal, normal heart sounds, intact distal pulses and normal pulses.   No murmur heard.  Pulmonary/Chest: Effort normal and breath sounds normal.   Abdominal: Soft. Normal appearance, normal aorta and bowel sounds are normal. There is no hepatomegaly. There is no  "tenderness.   Lymphadenopathy:     He has no cervical adenopathy.   Neurological: He is alert and oriented to person, place, and time. He has normal reflexes.   Skin: Skin is warm, dry and intact. Capillary refill takes less than 2 seconds.   Psychiatric: He has a normal mood and affect. His speech is normal and behavior is normal. Judgment and thought content normal. Cognition and memory are normal.     Results for orders placed or performed in visit on 02/26/20   POCT rapid strep A   Result Value Ref Range    Rapid Strep A Screen Negative Negative, VALID, INVALID, Not Performed    Internal Control Passed Passed    Lot Number [id number]     Expiration Date 4,[id number]      Assessment/Plan   Zach was seen today for cough and sore throat.    Diagnoses and all orders for this visit:    Sore throat  -     POCT rapid strep A    Mr. Serrano,\"Keron\" Nicol was seen in office today with new onset sore throat.  In office strep screen was negative.  I suspect this is more viral/allergy related.  Instructed to use over-the-counter cold and sinus medication for now.  No antibiotic is needed at this time.  He may use over-the-counter nasal saline spray to help irrigate his sinuses as well.  Keron will return to office if no improvement.  He voiced understanding.      RANGEL Villa [facility]  [address] [place] [id number]  Dept: [phone]  Dept Fax: [phone]  Loc: [phone]  Loc Fax: [phone]           Orders Only on 02/26/2020   Component Date Value Ref Range Status   • WBC 02/26/2020 6.24  3.40 - 10.80 10*3/mm3 Final   • RBC 02/26/2020 5.35  4.14 - 5.80 10*6/mm3 Final   • Hemoglobin 02/26/2020 15.9  13.0 - 17.7 g/dL Final   • Hematocrit 02/26/2020 45.0  37.5 - 51.0 % Final   • MCV 02/26/2020 84.1  79.0 - 97.0 fL Final   • MCH 02/26/2020 29.7  26.6 - 33.0 pg Final   • MCHC 02/26/2020 35.3  31.5 - 35.7 g/dL Final   • RDW 02/26/2020 " 12.9  12.3 - 15.4 % Final   • Platelets 02/26/2020 264  140 - 450 10*3/mm3 Final   • Neutrophil Rel % 02/26/2020 51.3  42.7 - 76.0 % Final   • Lymphocyte Rel % 02/26/2020 33.8  19.6 - 45.3 % Final   • Monocyte Rel % 02/26/2020 10.7  5.0 - 12.0 % Final   • Eosinophil Rel % 02/26/2020 2.6  0.3 - 6.2 % Final   • Basophil Rel % 02/26/2020 1.0  0.0 - 1.5 % Final   • Neutrophils Absolute 02/26/2020 3.20  1.70 - 7.00 10*3/mm3 Final   • Lymphocytes Absolute 02/26/2020 2.11  0.70 - 3.10 10*3/mm3 Final   • Monocytes Absolute 02/26/2020 0.67  0.10 - 0.90 10*3/mm3 Final   • Eosinophils Absolute 02/26/2020 0.16  0.00 - 0.40 10*3/mm3 Final   • Basophils Absolute 02/26/2020 0.06  0.00 - 0.20 10*3/mm3 Final   • Immature Granulocyte Rel % 02/26/2020 0.6* 0.0 - 0.5 % Final   • Immature Grans Absolute 02/26/2020 0.04  0.00 - 0.05 10*3/mm3 Final   • nRBC 02/26/2020 0.0  0.0 - 0.2 /100 WBC Final   • Glucose 02/26/2020 88  65 - 99 mg/dL Final   • BUN 02/26/2020 8  6 - 20 mg/dL Final   • Creatinine 02/26/2020 1.01  0.76 - 1.27 mg/dL Final   • eGFR Non  Am 02/26/2020 84  >60 mL/min/1.73 Final   • eGFR African Am 02/26/2020 101  >60 mL/min/1.73 Final   • BUN/Creatinine Ratio 02/26/2020 7.9  7.0 - 25.0 Final   • Sodium 02/26/2020 140  136 - 145 mmol/L Final   • Potassium 02/26/2020 4.6  3.5 - 5.2 mmol/L Final   • Chloride 02/26/2020 100  98 - 107 mmol/L Final   • Total CO2 02/26/2020 28.2  22.0 - 29.0 mmol/L Final   • Calcium 02/26/2020 10.3  8.6 - 10.5 mg/dL Final   • Total Protein 02/26/2020 7.5  6.0 - 8.5 g/dL Final   • Albumin 02/26/2020 5.00  3.50 - 5.20 g/dL Final   • Globulin 02/26/2020 2.5  gm/dL Final   • A/G Ratio 02/26/2020 2.0  g/dL Final   • Total Bilirubin 02/26/2020 0.6  0.2 - 1.2 mg/dL Final   • Alkaline Phosphatase 02/26/2020 80  39 - 117 U/L Final   • AST (SGOT) 02/26/2020 23  1 - 40 U/L Final   • ALT (SGPT) 02/26/2020 27  1 - 41 U/L Final   • Total Cholesterol 02/26/2020 246* 0 - 200 mg/dL Final   • Triglycerides  02/26/2020 142  0 - 150 mg/dL Final   • HDL Cholesterol 02/26/2020 53  40 - 60 mg/dL Final   • VLDL Cholesterol 02/26/2020 28.4  mg/dL Final   • LDL Cholesterol  02/26/2020 165* 0 - 100 mg/dL Final   • LDL/HDL Ratio 02/26/2020 3.11   Final   • TSH 02/26/2020 2.970  0.270 - 4.200 uIU/mL Final   • Specific Gravity, UA 02/26/2020 1.011  1.005 - 1.030 Final   • pH, UA 02/26/2020 7.5  5.0 - 8.0 Final   • Color, UA 02/26/2020 Yellow   Final    Comment: Urine microscopic not indicated.  REFERENCE RANGE: Yellow, Straw     • Appearance, UA 02/26/2020 Clear  Clear Final   • Leukocytes, UA 02/26/2020 Negative  Negative Final   • Protein 02/26/2020 Negative  Negative Final   • Glucose, UA 02/26/2020 Negative  Negative Final   • Ketones 02/26/2020 Negative  Negative Final   • Blood, UA 02/26/2020 Negative  Negative Final   • Bilirubin, UA 02/26/2020 Negative  Negative Final   • Urobilinogen, UA 02/26/2020 Comment   Final    Comment: 0.2 E.U./dL  REFERENCE RANGE: 0.2 - 1.0 E.U./dL     • Nitrite, UA 02/26/2020 Negative  Negative Final   Office Visit on 02/26/2020   Component Date Value Ref Range Status   • Rapid Strep A Screen 02/26/2020 Negative  Negative, VALID, INVALID, Not Performed Final   • Internal Control 02/26/2020 Passed  Passed Final   • Lot Number 02/26/2020 [id number]   Final   • Expiration Date 02/26/2020 [id number],[id number]   Final       Answers for HPI/ROS submitted by the patient on 2/24/2020   Sore throat  What is the primary reason for your visit?: Sore Throat

## 2020-02-27 LAB
ALBUMIN SERPL-MCNC: 5 G/DL (ref 3.5–5.2)
ALBUMIN/GLOB SERPL: 2 G/DL
ALP SERPL-CCNC: 80 U/L (ref 39–117)
ALT SERPL-CCNC: 27 U/L (ref 1–41)
APPEARANCE UR: CLEAR
AST SERPL-CCNC: 23 U/L (ref 1–40)
BASOPHILS # BLD AUTO: 0.06 10*3/MM3 (ref 0–0.2)
BASOPHILS NFR BLD AUTO: 1 % (ref 0–1.5)
BILIRUB SERPL-MCNC: 0.6 MG/DL (ref 0.2–1.2)
BILIRUB UR QL STRIP: NEGATIVE
BUN SERPL-MCNC: 8 MG/DL (ref 6–20)
BUN/CREAT SERPL: 7.9 (ref 7–25)
CALCIUM SERPL-MCNC: 10.3 MG/DL (ref 8.6–10.5)
CHLORIDE SERPL-SCNC: 100 MMOL/L (ref 98–107)
CHOLEST SERPL-MCNC: 246 MG/DL (ref 0–200)
CO2 SERPL-SCNC: 28.2 MMOL/L (ref 22–29)
COLOR UR: YELLOW
CREAT SERPL-MCNC: 1.01 MG/DL (ref 0.76–1.27)
EOSINOPHIL # BLD AUTO: 0.16 10*3/MM3 (ref 0–0.4)
EOSINOPHIL NFR BLD AUTO: 2.6 % (ref 0.3–6.2)
ERYTHROCYTE [DISTWIDTH] IN BLOOD BY AUTOMATED COUNT: 12.9 % (ref 12.3–15.4)
GLOBULIN SER CALC-MCNC: 2.5 GM/DL
GLUCOSE SERPL-MCNC: 88 MG/DL (ref 65–99)
GLUCOSE UR QL: NEGATIVE
HCT VFR BLD AUTO: 45 % (ref 37.5–51)
HDLC SERPL-MCNC: 53 MG/DL (ref 40–60)
HGB BLD-MCNC: 15.9 G/DL (ref 13–17.7)
HGB UR QL STRIP: NEGATIVE
IMM GRANULOCYTES # BLD AUTO: 0.04 10*3/MM3 (ref 0–0.05)
IMM GRANULOCYTES NFR BLD AUTO: 0.6 % (ref 0–0.5)
KETONES UR QL STRIP: NEGATIVE
LDLC SERPL CALC-MCNC: 165 MG/DL (ref 0–100)
LDLC/HDLC SERPL: 3.11 {RATIO}
LEUKOCYTE ESTERASE UR QL STRIP: NEGATIVE
LYMPHOCYTES # BLD AUTO: 2.11 10*3/MM3 (ref 0.7–3.1)
LYMPHOCYTES NFR BLD AUTO: 33.8 % (ref 19.6–45.3)
MCH RBC QN AUTO: 29.7 PG (ref 26.6–33)
MCHC RBC AUTO-ENTMCNC: 35.3 G/DL (ref 31.5–35.7)
MCV RBC AUTO: 84.1 FL (ref 79–97)
MONOCYTES # BLD AUTO: 0.67 10*3/MM3 (ref 0.1–0.9)
MONOCYTES NFR BLD AUTO: 10.7 % (ref 5–12)
NEUTROPHILS # BLD AUTO: 3.2 10*3/MM3 (ref 1.7–7)
NEUTROPHILS NFR BLD AUTO: 51.3 % (ref 42.7–76)
NITRITE UR QL STRIP: NEGATIVE
NRBC BLD AUTO-RTO: 0 /100 WBC (ref 0–0.2)
PH UR STRIP: 7.5 [PH] (ref 5–8)
PLATELET # BLD AUTO: 264 10*3/MM3 (ref 140–450)
POTASSIUM SERPL-SCNC: 4.6 MMOL/L (ref 3.5–5.2)
PROT SERPL-MCNC: 7.5 G/DL (ref 6–8.5)
PROT UR QL STRIP: NEGATIVE
RBC # BLD AUTO: 5.35 10*6/MM3 (ref 4.14–5.8)
SODIUM SERPL-SCNC: 140 MMOL/L (ref 136–145)
SP GR UR: 1.01 (ref 1–1.03)
TRIGL SERPL-MCNC: 142 MG/DL (ref 0–150)
TSH SERPL DL<=0.005 MIU/L-ACNC: 2.97 UIU/ML (ref 0.27–4.2)
UROBILINOGEN UR STRIP-MCNC: NORMAL MG/DL
VLDLC SERPL CALC-MCNC: 28.4 MG/DL
WBC # BLD AUTO: 6.24 10*3/MM3 (ref 3.4–10.8)

## 2020-03-28 ENCOUNTER — LAB (OUTPATIENT)
Dept: LAB | Facility: HOSPITAL | Age: 35
End: 2020-03-28

## 2020-03-28 ENCOUNTER — TRANSCRIBE ORDERS (OUTPATIENT)
Dept: ADMINISTRATIVE | Facility: HOSPITAL | Age: 35
End: 2020-03-28

## 2020-03-28 DIAGNOSIS — L93.0 CHRONIC DISCOID LUPUS ERYTHEMATOSUS: Primary | ICD-10-CM

## 2020-03-28 DIAGNOSIS — Z00.00 ROUTINE GENERAL MEDICAL EXAMINATION AT A HEALTH CARE FACILITY: ICD-10-CM

## 2020-03-28 DIAGNOSIS — L93.0 CHRONIC DISCOID LUPUS ERYTHEMATOSUS: ICD-10-CM

## 2020-03-28 PROCEDURE — 86038 ANTINUCLEAR ANTIBODIES: CPT

## 2020-03-28 PROCEDURE — 36415 COLL VENOUS BLD VENIPUNCTURE: CPT

## 2020-03-30 LAB — ANA SER QL: NEGATIVE

## 2020-05-27 ENCOUNTER — OFFICE VISIT (OUTPATIENT)
Dept: FAMILY MEDICINE CLINIC | Facility: CLINIC | Age: 35
End: 2020-05-27

## 2020-05-27 VITALS
HEART RATE: 90 BPM | HEIGHT: 68 IN | SYSTOLIC BLOOD PRESSURE: 120 MMHG | OXYGEN SATURATION: 97 % | RESPIRATION RATE: 16 BRPM | WEIGHT: 166 LBS | TEMPERATURE: 97.8 F | DIASTOLIC BLOOD PRESSURE: 80 MMHG | BODY MASS INDEX: 25.16 KG/M2

## 2020-05-27 DIAGNOSIS — K62.89 RECTAL DISCOMFORT: Primary | ICD-10-CM

## 2020-05-27 PROCEDURE — 99213 OFFICE O/P EST LOW 20 MIN: CPT | Performed by: FAMILY MEDICINE

## 2020-05-27 NOTE — PROGRESS NOTES
Chief Complaint   Patient presents with   • Anal Itching       Subjective   Zach Camarena is a 34 y.o. male.     History of Present Illness     33 yo male here to discuss concern for tapeworm    States he has been reading online and was worried that he could have some kind of tapeworm.  He is not having any GI upset.  He is eating normally no nausea vomiting diarrhea constipation.  Has a bowel movement at least every other day that is normal.  Not having any rectal pain.  Denies any hemorrhoids.  He will occasionally have a feeling of rectal discomfort when he is driving but that will go away spontaneously.    Said that he had a similar concern about a year ago.  He had his stool tested for ova and parasites and it was negative.  Since that time he has not traveled outside of the country.  No other major life changes.  No changes in bowel habits.    The following portions of the patient's history were reviewed and updated as appropriate: allergies, current medications, past family history, past medical history, past social history, past surgical history and problem list.      Past Medical History:   Diagnosis Date   • GERD (gastroesophageal reflux disease)        Past Surgical History:   Procedure Laterality Date   • CLAVICLE SURGERY     • ENDOSCOPY N/A 2/14/2018    Procedure: ESOPHAGOGASTRODUODENOSCOPY with biopsies;  Surgeon: Kavya Kerns MD;  Location: [facility] ENDOSCOPY;  Service:    • NEPHRECTOMY      as infant   • UPPER GASTROINTESTINAL ENDOSCOPY  2010       Family History   Problem Relation Age of Onset   • No Known Problems Mother    • Irregular heart beat Father    • No Known Problems Sister    • No Known Problems Brother    • No Known Problems Daughter    • No Known Problems Son    • Cancer Maternal Uncle    • No Known Problems Brother        Social History     Socioeconomic History   • Marital status:      Spouse name: Not on file   • Number of children: Not on file   • Years of education:  Not on file   • Highest education level: Not on file   Tobacco Use   • Smoking status: Former Smoker     Last attempt to quit: 2000     Years since quittin.4   • Smokeless tobacco: Current User     Types: Chew   Substance and Sexual Activity   • Alcohol use: No   • Drug use: No   • Sexual activity: Yes       Current Outpatient Medications on File Prior to Visit   Medication Sig Dispense Refill   • Crisaborole (EUCRISA) 2 % ointment Apply 1 application topically 2 (Two) Times a Day. 60 g 0   • Esomeprazole Magnesium (NEXIUM PO) Take  by mouth.     • ketoconazole (NIZORAL) 2 % shampoo      • mometasone (ELOCON) 0.1 % cream        No current facility-administered medications on file prior to visit.        Review of Systems   Constitutional: Negative for chills and fever.   HENT: Negative for congestion.    Respiratory: Negative for cough.    Gastrointestinal: Negative for abdominal pain, blood in stool, constipation, diarrhea, nausea, vomiting, GERD and indigestion.   Genitourinary: Negative for decreased urine volume.           Vitals:    20 0843   BP: 120/80   Pulse: 90   Resp: 16   Temp: 97.8 °F (36.6 °C)   SpO2: 97%      Objective   Physical Exam   Constitutional: He is oriented to person, place, and time. He appears well-developed and well-nourished.   HENT:   Head: Normocephalic and atraumatic.   Eyes: Pupils are equal, round, and reactive to light. Conjunctivae and EOM are normal.   Neck: Neck supple.   Cardiovascular: Normal rate, regular rhythm and normal heart sounds.   No murmur heard.  Pulmonary/Chest: Effort normal and breath sounds normal. No respiratory distress.   Abdominal: Soft. Bowel sounds are normal.   Musculoskeletal: Normal range of motion.   Neurological: He is alert and oriented to person, place, and time.   Skin: Skin is warm and dry.   Psychiatric: He has a normal mood and affect.   Nursing note and vitals reviewed.  Declines rectal exam      Assessment/Plan   Problem List Items  Addressed This Visit     None      Visit Diagnoses     Rectal discomfort    -  Primary        -Feels well.  Primarily worried about tapeworm.  Not having symptoms consistent with this.  Already has been tested for ova and parasites a year ago and was normal.  No new travel etc. since that time.  -Let us know if develops any new symptoms or new concerns    Virgen Schultz MD

## 2020-08-27 ENCOUNTER — OFFICE VISIT (OUTPATIENT)
Dept: FAMILY MEDICINE CLINIC | Facility: CLINIC | Age: 35
End: 2020-08-27

## 2020-08-27 VITALS
OXYGEN SATURATION: 98 % | SYSTOLIC BLOOD PRESSURE: 110 MMHG | RESPIRATION RATE: 16 BRPM | HEART RATE: 59 BPM | HEIGHT: 68 IN | WEIGHT: 144 LBS | DIASTOLIC BLOOD PRESSURE: 68 MMHG | BODY MASS INDEX: 21.82 KG/M2 | TEMPERATURE: 98.2 F

## 2020-08-27 DIAGNOSIS — F90.9 ATTENTION DEFICIT HYPERACTIVITY DISORDER (ADHD), UNSPECIFIED ADHD TYPE: Primary | ICD-10-CM

## 2020-08-27 DIAGNOSIS — F90.8 ATTENTION DEFICIT HYPERACTIVITY DISORDER (ADHD), OTHER TYPE: Primary | ICD-10-CM

## 2020-08-27 DIAGNOSIS — Z79.899 HIGH RISK MEDICATION USE: ICD-10-CM

## 2020-08-27 PROCEDURE — 99214 OFFICE O/P EST MOD 30 MIN: CPT | Performed by: PHYSICIAN ASSISTANT

## 2020-08-27 PROCEDURE — 93000 ELECTROCARDIOGRAM COMPLETE: CPT | Performed by: PHYSICIAN ASSISTANT

## 2020-08-27 NOTE — PROGRESS NOTES
Subjective   Zach Camarena is a 34 y.o. male presents for   Chief Complaint   Patient presents with   • ADHD       History of Present Illness     Keron is a 34-year-old male who presents with chronic and uncontrolled ADHD.  States he has been on Vyvanse in past and did well.  He thought he could do without the medication but has noticed decrease focusing and concentration.  States he cannot finish projects and starts multiple things.  His wife has noticed that he bounces around from one topic to another.  He took Adderall in high school but had side effects.  Made him too jittery.  He also took Reglan but did not like.  He was changed due to side effects.  He is unsure what the side effects were.  Denied any suicidal or homicidal ideation.  He exercises regularly to help with his generalized health.  Sleep has been normal.  Currently works day shift for the [employer].  Appetite has been overall healthy.  Does drink approximately 2-3 cups of coffee during the day.  He may have 2 Cokes during the day as well.  Drinks a rare energy drink.  He tries to drink water as well.  Denied any chest pain, shortness of air, wheezing, headache, dizziness, lightheadedness, suicidal or homicidal ideation.  He had no cardiac issues with the amphetamines in past.    The following portions of the patient's history were reviewed and updated as appropriate: allergies, current medications, past family history, past medical history, past social history, past surgical history and problem list.    Review of Systems   Constitutional: Negative.    HENT: Negative.    Eyes: Negative.    Respiratory: Negative.  Negative for cough, chest tightness, shortness of breath and wheezing.    Cardiovascular: Negative.  Negative for chest pain, palpitations and leg swelling.   Gastrointestinal: Negative.    Endocrine: Negative.    Genitourinary: Negative.    Musculoskeletal: Negative.    Skin: Negative.    Allergic/Immunologic:  "Negative.    Neurological: Negative.  Negative for dizziness, light-headedness and headaches.   Hematological: Negative.    Psychiatric/Behavioral: Positive for decreased concentration. Negative for sleep disturbance and suicidal ideas.   All other systems reviewed and are negative.        Vitals:    20 1326   BP: 110/68   Pulse: 59   Resp: 16   Temp: 98.2 °F (36.8 °C)   SpO2: 98%   Weight: 65.3 kg (144 lb)   Height: 172.7 cm (68\")     Wt Readings from Last 3 Encounters:   20 65.3 kg (144 lb)   20 75.3 kg (166 lb)   20 66.2 kg (146 lb)     BP Readings from Last 3 Encounters:   20 110/68   20 134/94   20 120/80     Social History     Socioeconomic History   • Marital status:      Spouse name: Not on file   • Number of children: Not on file   • Years of education: Not on file   • Highest education level: Not on file   Tobacco Use   • Smoking status: Former Smoker     Last attempt to quit: 2000     Years since quittin.6   • Smokeless tobacco: Current User     Types: Chew   Substance and Sexual Activity   • Alcohol use: No   • Drug use: No   • Sexual activity: Yes       Allergies   Allergen Reactions   • No Known Drug Allergy        Body mass index is 21.9 kg/m².    Objective   Physical Exam   Constitutional: He is oriented to person, place, and time. Vital signs are normal. He appears well-developed and well-nourished.   HENT:   Head: Normocephalic and atraumatic.   Neck: Trachea normal and phonation normal. Neck supple. No tracheal tenderness present. No tracheal deviation present. No thyroid mass and no thyromegaly present.   Cardiovascular: Normal rate, regular rhythm, S1 normal, S2 normal, normal heart sounds and normal pulses.   No murmur heard.  Pulmonary/Chest: Effort normal and breath sounds normal.   Abdominal: Soft. Normal appearance, normal aorta and bowel sounds are normal. There is no hepatomegaly. There is no tenderness.   Neurological: He is alert and " oriented to person, place, and time.   Skin: Skin is warm, dry and intact. Capillary refill takes less than 2 seconds.   Psychiatric: He has a normal mood and affect. His speech is normal and behavior is normal. Judgment and thought content normal. Cognition and memory are normal.      I was wearing surgical mask during the entire office visit encounter.          ECG 12 Lead  Date/Time: 8/27/2020 1:59 PM  Performed by: Tashia Liu PA-C  Authorized by: Tashia Liu PA-C   Comparison: compared with previous ECG from 3/6/2019  Rhythm: sinus bradycardia  BPM: 50  Conduction: conduction normal  ST Segments: ST segments normal  T Waves: T waves normal    Clinical impression: normal ECG  Comments: Indication ADHD with high risk medication usage  LA:  148 ms  QRS:  79 ms  QT/QTc:  403/375 ms          Assessment/Plan   Zach was seen today for adhd.    Diagnoses and all orders for this visit:    Attention deficit hyperactivity disorder (ADHD), unspecified ADHD type  -     ECG 12 Lead  -     Compliance Drug Analysis, Ur - Urine, Clean Catch    High risk medication use  -     ECG 12 Lead  -     Compliance Drug Analysis, Ur - Urine, Clean Catch      Mr. Zach Camarena was seen in office today for chronic ADHD management.  He has been on Adderall and Ritalin had side effects.  He did take Vyvanse which helped.  Will restart the Vyvanse 10 mg daily.  I have sent the prescription request to Dr. Cal Dillon for approval.  Keron has signed the appropriate agreement and consent forms for the Vyvanse medication.  See below for Azael information.  He will have a baseline urine drug screen collected at office visit today.  He will schedule follow-up appointment in 1 month for reevaluation.      As part of this patient's treatment plan I am prescribing controlled substances.  The patient has been made aware of appropriate use of such medications, including potential risk of somnolence. Limited ability to drive and/or  work safely, and potential for dependence or overdose.  It has also been made clear that these medications are for use by this patient only, without concomitant use of alcohol or other substances unless prescribed.  LAUREEN report has been reviewed and scanned into the patient's chart.  Laureen number is [id number] dated 08/25/2020.  [facility]  [address] [place] [id number]  Dept: [phone]  Dept Fax: [phone]  Loc: [phone]  Loc Fax: [phone]

## 2020-08-28 NOTE — PROGRESS NOTES
I, Dr. Cal Dillon, have reviewed the notes, assessments, and/or procedures performed by Tashia SUNSHINE, that occurred within our practice at [facility] location, I concur with her documentation of Zach Camarena. I have a current collaborative medical agreement with Tashia SUNSHINE.

## 2020-08-31 LAB — DRUGS UR: NORMAL

## 2021-08-23 ENCOUNTER — TELEPHONE (OUTPATIENT)
Dept: FAMILY MEDICINE CLINIC | Facility: CLINIC | Age: 36
End: 2021-08-23

## 2021-08-23 NOTE — TELEPHONE ENCOUNTER
Can we set up a video MyChart visit with him this week?  O/w Tylenol/ibuprofen for the fever and headaches and body aches  Any OTC cough cold medication is fine      Cal Dillon MD

## 2021-11-03 DIAGNOSIS — B85.0 HEAD LICE: Primary | ICD-10-CM

## (undated) DEVICE — TUBING, SUCTION, 1/4" X 10', STRAIGHT: Brand: MEDLINE

## (undated) DEVICE — TBG 02 CRUSH RESIST LF CLR 7FT

## (undated) DEVICE — BITEBLOCK OMNI BLOC

## (undated) DEVICE — Device: Brand: DEFENDO AIR/WATER/SUCTION AND BIOPSY VALVE

## (undated) DEVICE — CANN NASL CO2 TRULINK W/O2 A/

## (undated) DEVICE — FRCP BX RADJAW4 NDL 2.8 240CM LG OG BX40